# Patient Record
Sex: MALE | Race: WHITE | Employment: OTHER | URBAN - METROPOLITAN AREA
[De-identification: names, ages, dates, MRNs, and addresses within clinical notes are randomized per-mention and may not be internally consistent; named-entity substitution may affect disease eponyms.]

---

## 2017-09-09 PROBLEM — C34.11 MALIGNANT NEOPLASM OF UPPER LOBE OF RIGHT LUNG (HCC): Status: ACTIVE | Noted: 2017-09-09

## 2017-09-09 PROBLEM — Z98.890 STATUS POST THORACOTOMY: Status: ACTIVE | Noted: 2017-09-09

## 2017-09-09 PROBLEM — R07.81 PLEURITIC CHEST PAIN: Status: ACTIVE | Noted: 2017-09-09

## 2017-10-25 PROBLEM — C34.91 MALIGNANT NEOPLASM OF RIGHT LUNG, UNSPECIFIED PART OF LUNG (HCC): Status: ACTIVE | Noted: 2017-10-25

## 2017-10-25 PROBLEM — D64.9 ANEMIA, UNSPECIFIED TYPE: Status: ACTIVE | Noted: 2017-10-25

## 2018-01-20 PROBLEM — I10 ESSENTIAL HYPERTENSION: Status: ACTIVE | Noted: 2018-01-20

## 2018-01-20 PROBLEM — M54.50 CHRONIC BILATERAL LOW BACK PAIN WITHOUT SCIATICA: Status: ACTIVE | Noted: 2018-01-20

## 2018-01-20 PROBLEM — G89.29 CHRONIC BILATERAL LOW BACK PAIN WITHOUT SCIATICA: Status: ACTIVE | Noted: 2018-01-20

## 2018-06-11 PROBLEM — R07.81 PLEURITIC CHEST PAIN: Status: RESOLVED | Noted: 2017-09-09 | Resolved: 2018-06-11

## 2018-06-11 PROBLEM — D64.9 ANEMIA, UNSPECIFIED TYPE: Status: RESOLVED | Noted: 2017-10-25 | Resolved: 2018-06-11

## 2019-03-17 PROBLEM — G47.00 INSOMNIA, UNSPECIFIED TYPE: Status: ACTIVE | Noted: 2019-03-17

## 2019-03-17 PROBLEM — J44.9 CHRONIC OBSTRUCTIVE PULMONARY DISEASE, UNSPECIFIED COPD TYPE (HCC): Status: ACTIVE | Noted: 2019-03-17

## 2019-08-27 ENCOUNTER — APPOINTMENT (OUTPATIENT)
Dept: ULTRASOUND IMAGING | Facility: HOSPITAL | Age: 74
DRG: 246 | End: 2019-08-27
Attending: THORACIC SURGERY (CARDIOTHORACIC VASCULAR SURGERY)
Payer: MEDICARE

## 2019-08-27 ENCOUNTER — HOSPITAL ENCOUNTER (INPATIENT)
Facility: HOSPITAL | Age: 74
LOS: 9 days | Discharge: HOME HEALTH | DRG: 246 | End: 2019-09-05
Attending: THORACIC SURGERY (CARDIOTHORACIC VASCULAR SURGERY) | Admitting: THORACIC SURGERY (CARDIOTHORACIC VASCULAR SURGERY)
Payer: MEDICARE

## 2019-08-27 ENCOUNTER — APPOINTMENT (OUTPATIENT)
Dept: GENERAL RADIOLOGY | Facility: HOSPITAL | Age: 74
DRG: 246 | End: 2019-08-27
Attending: THORACIC SURGERY (CARDIOTHORACIC VASCULAR SURGERY)
Payer: MEDICARE

## 2019-08-27 DIAGNOSIS — Z98.890 STATUS POST THORACOTOMY: ICD-10-CM

## 2019-08-27 DIAGNOSIS — I10 ESSENTIAL HYPERTENSION: Primary | ICD-10-CM

## 2019-08-27 LAB
ALLENS TEST: POSITIVE
ANTIBODY SCREEN: NEGATIVE
APTT PPP: 26.3 SECONDS (ref 25.4–36.1)
ARTERIAL BLD GAS O2 SATURATION: 93 % (ref 92–100)
ARTERIAL BLOOD GAS BASE EXCESS: -1.5 MMOL/L (ref ?–2)
ARTERIAL BLOOD GAS HCO3: 23.3 MEQ/L (ref 22–26)
ARTERIAL BLOOD GAS PCO2: 40 MM HG (ref 35–45)
ARTERIAL BLOOD GAS PH: 7.38 (ref 7.35–7.45)
ARTERIAL BLOOD GAS PO2: 69 MM HG (ref 80–105)
CALCULATED O2 SATURATION: 93 % (ref 92–100)
CARBOXYHEMOGLOBIN: 1.4 % SAT (ref 0–3)
DEPRECATED RDW RBC AUTO: 53.2 FL (ref 35.1–46.3)
ERYTHROCYTE [DISTWIDTH] IN BLOOD BY AUTOMATED COUNT: 17.7 % (ref 11–15)
EST. AVERAGE GLUCOSE BLD GHB EST-MCNC: 134 MG/DL (ref 68–126)
HBA1C MFR BLD HPLC: 6.3 % (ref ?–5.7)
HCT VFR BLD AUTO: 40.5 % (ref 39–53)
HGB BLD-MCNC: 12.6 G/DL (ref 13–17.5)
INR BLD: 1.15 (ref 0.9–1.1)
MCH RBC QN AUTO: 26 PG (ref 26–34)
MCHC RBC AUTO-ENTMCNC: 31.1 G/DL (ref 31–37)
MCV RBC AUTO: 83.7 FL (ref 80–100)
METHEMOGLOBIN: 0.4 % SAT (ref 0.4–1.5)
P/F RATIO: 330.3 MMHG
PATIENT TEMPERATURE: 98.6 F
PLATELET # BLD AUTO: 241 10(3)UL (ref 150–450)
PSA SERPL DL<=0.01 NG/ML-MCNC: 15.2 SECONDS (ref 12.5–14.7)
RBC # BLD AUTO: 4.84 X10(6)UL (ref 3.8–5.8)
RH BLOOD TYPE: POSITIVE
TOTAL HEMOGLOBIN: 12.6 G/DL (ref 12.6–17.4)
WBC # BLD AUTO: 18.5 X10(3) UL (ref 4–11)

## 2019-08-27 PROCEDURE — 85730 THROMBOPLASTIN TIME PARTIAL: CPT | Performed by: PHYSICIAN ASSISTANT

## 2019-08-27 PROCEDURE — 30233N1 TRANSFUSION OF NONAUTOLOGOUS RED BLOOD CELLS INTO PERIPHERAL VEIN, PERCUTANEOUS APPROACH: ICD-10-PCS | Performed by: HOSPITALIST

## 2019-08-27 PROCEDURE — 83036 HEMOGLOBIN GLYCOSYLATED A1C: CPT | Performed by: PHYSICIAN ASSISTANT

## 2019-08-27 PROCEDURE — 82803 BLOOD GASES ANY COMBINATION: CPT | Performed by: THORACIC SURGERY (CARDIOTHORACIC VASCULAR SURGERY)

## 2019-08-27 PROCEDURE — 86901 BLOOD TYPING SEROLOGIC RH(D): CPT | Performed by: PHYSICIAN ASSISTANT

## 2019-08-27 PROCEDURE — 86920 COMPATIBILITY TEST SPIN: CPT

## 2019-08-27 PROCEDURE — 85027 COMPLETE CBC AUTOMATED: CPT | Performed by: PHYSICIAN ASSISTANT

## 2019-08-27 PROCEDURE — 85610 PROTHROMBIN TIME: CPT | Performed by: PHYSICIAN ASSISTANT

## 2019-08-27 PROCEDURE — 94640 AIRWAY INHALATION TREATMENT: CPT

## 2019-08-27 PROCEDURE — 36410 VNPNXR 3YR/> PHY/QHP DX/THER: CPT

## 2019-08-27 PROCEDURE — 86850 RBC ANTIBODY SCREEN: CPT | Performed by: PHYSICIAN ASSISTANT

## 2019-08-27 PROCEDURE — 87081 CULTURE SCREEN ONLY: CPT | Performed by: INTERNAL MEDICINE

## 2019-08-27 PROCEDURE — 82375 ASSAY CARBOXYHB QUANT: CPT | Performed by: THORACIC SURGERY (CARDIOTHORACIC VASCULAR SURGERY)

## 2019-08-27 PROCEDURE — 94010 BREATHING CAPACITY TEST: CPT

## 2019-08-27 PROCEDURE — 86900 BLOOD TYPING SEROLOGIC ABO: CPT | Performed by: PHYSICIAN ASSISTANT

## 2019-08-27 PROCEDURE — 76937 US GUIDE VASCULAR ACCESS: CPT

## 2019-08-27 PROCEDURE — 85018 HEMOGLOBIN: CPT | Performed by: THORACIC SURGERY (CARDIOTHORACIC VASCULAR SURGERY)

## 2019-08-27 PROCEDURE — 36600 WITHDRAWAL OF ARTERIAL BLOOD: CPT | Performed by: THORACIC SURGERY (CARDIOTHORACIC VASCULAR SURGERY)

## 2019-08-27 PROCEDURE — 71045 X-RAY EXAM CHEST 1 VIEW: CPT | Performed by: THORACIC SURGERY (CARDIOTHORACIC VASCULAR SURGERY)

## 2019-08-27 PROCEDURE — 83050 HGB METHEMOGLOBIN QUAN: CPT | Performed by: THORACIC SURGERY (CARDIOTHORACIC VASCULAR SURGERY)

## 2019-08-27 PROCEDURE — 93880 EXTRACRANIAL BILAT STUDY: CPT | Performed by: THORACIC SURGERY (CARDIOTHORACIC VASCULAR SURGERY)

## 2019-08-27 RX ORDER — METHYLPREDNISOLONE SODIUM SUCCINATE 125 MG/2ML
60 INJECTION, POWDER, LYOPHILIZED, FOR SOLUTION INTRAMUSCULAR; INTRAVENOUS EVERY 6 HOURS
Status: DISCONTINUED | OUTPATIENT
Start: 2019-08-28 | End: 2019-08-29

## 2019-08-27 RX ORDER — IPRATROPIUM BROMIDE AND ALBUTEROL SULFATE 2.5; .5 MG/3ML; MG/3ML
3 SOLUTION RESPIRATORY (INHALATION)
Status: DISCONTINUED | OUTPATIENT
Start: 2019-08-27 | End: 2019-08-27

## 2019-08-27 RX ORDER — METHYLPREDNISOLONE SODIUM SUCCINATE 125 MG/2ML
60 INJECTION, POWDER, LYOPHILIZED, FOR SOLUTION INTRAMUSCULAR; INTRAVENOUS EVERY 6 HOURS
Status: DISCONTINUED | OUTPATIENT
Start: 2019-08-27 | End: 2019-08-27

## 2019-08-27 RX ORDER — SODIUM CHLORIDE 0.9 % (FLUSH) 0.9 %
10 SYRINGE (ML) INJECTION EVERY 12 HOURS
Status: DISCONTINUED | OUTPATIENT
Start: 2019-08-27 | End: 2019-09-05

## 2019-08-27 RX ORDER — IPRATROPIUM BROMIDE AND ALBUTEROL SULFATE 2.5; .5 MG/3ML; MG/3ML
3 SOLUTION RESPIRATORY (INHALATION)
Status: DISCONTINUED | OUTPATIENT
Start: 2019-08-28 | End: 2019-08-31

## 2019-08-27 NOTE — RESPIRATORY THERAPY NOTE
RT called for ABG and bedside PFT orders, patient in ultrasound at this time. Will check back later.

## 2019-08-27 NOTE — CONSULTS
General Medicine Consult      Reason for consult:  medical management     Consulted by: Dr. Andreea Segundo    PCP: Madeline Lomeli MD      History of Present Illness:   Patient is a 66year old male with PMH sig for COPD, HTN, HLD, lung cancer s/p prior right lower lobe Medication:       ALL:  No Known Allergies     Soc Hx:  Social History    Tobacco Use      Smoking status: Former Smoker        Types: Cigarettes        Quit date: 1988        Years since quittin.9      Smokeless tobacco: Never Used    Alcohol use cancer s/p RLL lobectomy  - Pulmonary c/s  - IV steroids per pulmonary  - nebs    # Impaired fasting glucose  - Check sugar in AM  - No hx of DM    Dispo: admitted for evaluation for CABG    Outpatient records or previous hospital records reviewed.      Joanne

## 2019-08-27 NOTE — CONSULTS
[facility] Patient Status:  Inpatient    1945 MRN [id number]   [facility] 8NE-A Attending Sean Ontiveros MD   Hosp Day # 0 PCP Miriam Vásquez MD     Date of Admission: 2019  2:00 PM  Admission Diagnosis: CAD,mu cigarettes. He has never used smokeless tobacco. He reports that he does not drink alcohol or use drugs.       Family History:  Family History   Problem Relation Age of Onset   • Heart Attack Father          Home Medications:    Outpatient Medications Suzanna abuse  Endocrine: denies polydypsia, polyuria, cold/heat intolerance  Hematologic/lymphatic: denies easy bruising, new blood clots, or lymphedema  Allergic/immunologic: denies hay fever, hives, or new allergies       OBJECTIVE:  Patient Vitals for the past ASSESSMENT/PLAN:  Pre-op Pulmonary Risk Assessment:  Patient is at high risk for postoperative pulmonary complications including atelectasis, pneumonia, exacerbation of underlying COPD and potential need for prolonged mechanical ventilation.  - Patient h would likely be even lower currently  -will follow up on bedside PFT  -ABG without evidence of chronic hypercapnea  H/o RLL NSCLC: s/p RLL lobectomy 2017  -follows with [facility] oncology  -most recent CT without evidence of recurrent disease  Dispo:  -will fol

## 2019-08-27 NOTE — PROGRESS NOTES
Received report from RN at [facility]. Pt arrived to unit as a direct admit via EMS to room 8609. Pt denies any chest pain at this time, but does c/o SUNG. VSS. Alert and oriented X 4 . Pt arrived with a #20g in his left AC.  Consulted IV team to plac

## 2019-08-28 LAB
ALBUMIN SERPL-MCNC: 3.1 G/DL (ref 3.4–5)
ALBUMIN/GLOB SERPL: 0.9 {RATIO} (ref 1–2)
ALP LIVER SERPL-CCNC: 73 U/L (ref 45–117)
ALT SERPL-CCNC: 41 U/L (ref 16–61)
ANION GAP SERPL CALC-SCNC: 6 MMOL/L (ref 0–18)
BASOPHILS # BLD AUTO: 0.01 X10(3) UL (ref 0–0.2)
BASOPHILS NFR BLD AUTO: 0.1 %
BILIRUB SERPL-MCNC: 0.6 MG/DL (ref 0.1–2)
BILIRUB UR QL STRIP.AUTO: NEGATIVE
BUN BLD-MCNC: 42 MG/DL (ref 7–18)
BUN/CREAT SERPL: 44.2 (ref 10–20)
CALCIUM BLD-MCNC: 8.1 MG/DL (ref 8.5–10.1)
CHLORIDE SERPL-SCNC: 99 MMOL/L (ref 98–112)
CHOLEST SMN-MCNC: 165 MG/DL (ref ?–200)
CLARITY UR REFRACT.AUTO: CLEAR
CO2 SERPL-SCNC: 27 MMOL/L (ref 21–32)
COLOR UR AUTO: YELLOW
CREAT BLD-MCNC: 0.95 MG/DL (ref 0.7–1.3)
DEPRECATED RDW RBC AUTO: 51.3 FL (ref 35.1–46.3)
EOSINOPHIL # BLD AUTO: 0 X10(3) UL (ref 0–0.7)
EOSINOPHIL NFR BLD AUTO: 0 %
ERYTHROCYTE [DISTWIDTH] IN BLOOD BY AUTOMATED COUNT: 17.2 % (ref 11–15)
GLOBULIN PLAS-MCNC: 3.4 G/DL (ref 2.8–4.4)
GLUCOSE BLD-MCNC: 122 MG/DL (ref 70–99)
GLUCOSE UR STRIP.AUTO-MCNC: NEGATIVE MG/DL
HCT VFR BLD AUTO: 35.3 % (ref 39–53)
HDLC SERPL-MCNC: 36 MG/DL (ref 40–59)
HGB BLD-MCNC: 11.1 G/DL (ref 13–17.5)
IMM GRANULOCYTES # BLD AUTO: 0.06 X10(3) UL (ref 0–1)
IMM GRANULOCYTES NFR BLD: 0.5 %
KETONES UR STRIP.AUTO-MCNC: NEGATIVE MG/DL
LDLC SERPL CALC-MCNC: 94 MG/DL (ref ?–100)
LEUKOCYTE ESTERASE UR QL STRIP.AUTO: NEGATIVE
LYMPHOCYTES # BLD AUTO: 0.61 X10(3) UL (ref 1–4)
LYMPHOCYTES NFR BLD AUTO: 5.1 %
M PROTEIN MFR SERPL ELPH: 6.5 G/DL (ref 6.4–8.2)
MCH RBC QN AUTO: 25.9 PG (ref 26–34)
MCHC RBC AUTO-ENTMCNC: 31.4 G/DL (ref 31–37)
MCV RBC AUTO: 82.3 FL (ref 80–100)
MONOCYTES # BLD AUTO: 0.29 X10(3) UL (ref 0.1–1)
MONOCYTES NFR BLD AUTO: 2.4 %
NEUTROPHILS # BLD AUTO: 11.01 X10 (3) UL (ref 1.5–7.7)
NEUTROPHILS # BLD AUTO: 11.01 X10(3) UL (ref 1.5–7.7)
NEUTROPHILS NFR BLD AUTO: 91.9 %
NITRITE UR QL STRIP.AUTO: NEGATIVE
NONHDLC SERPL-MCNC: 129 MG/DL (ref ?–130)
OSMOLALITY SERPL CALC.SUM OF ELEC: 286 MOSM/KG (ref 275–295)
PH UR STRIP.AUTO: 6 [PH] (ref 4.5–8)
PLATELET # BLD AUTO: 148 10(3)UL (ref 150–450)
POTASSIUM SERPL-SCNC: 4.5 MMOL/L (ref 3.5–5.1)
PROT UR STRIP.AUTO-MCNC: NEGATIVE MG/DL
RBC # BLD AUTO: 4.29 X10(6)UL (ref 3.8–5.8)
RBC UR QL AUTO: NEGATIVE
SODIUM SERPL-SCNC: 132 MMOL/L (ref 136–145)
SP GR UR STRIP.AUTO: 1.01 (ref 1–1.03)
TRIGL SERPL-MCNC: 176 MG/DL (ref 30–149)
UROBILINOGEN UR STRIP.AUTO-MCNC: <2 MG/DL
VLDLC SERPL CALC-MCNC: 35 MG/DL (ref 0–30)
WBC # BLD AUTO: 12 X10(3) UL (ref 4–11)

## 2019-08-28 PROCEDURE — 80053 COMPREHEN METABOLIC PANEL: CPT | Performed by: PHYSICIAN ASSISTANT

## 2019-08-28 PROCEDURE — 94640 AIRWAY INHALATION TREATMENT: CPT

## 2019-08-28 PROCEDURE — 84132 ASSAY OF SERUM POTASSIUM: CPT | Performed by: INTERNAL MEDICINE

## 2019-08-28 PROCEDURE — 80061 LIPID PANEL: CPT | Performed by: PHYSICIAN ASSISTANT

## 2019-08-28 PROCEDURE — 85025 COMPLETE CBC W/AUTO DIFF WBC: CPT | Performed by: INTERNAL MEDICINE

## 2019-08-28 PROCEDURE — 81003 URINALYSIS AUTO W/O SCOPE: CPT | Performed by: PHYSICIAN ASSISTANT

## 2019-08-28 PROCEDURE — 36592 COLLECT BLOOD FROM PICC: CPT

## 2019-08-28 RX ORDER — ATORVASTATIN CALCIUM 20 MG/1
20 TABLET, FILM COATED ORAL NIGHTLY
Status: DISCONTINUED | OUTPATIENT
Start: 2019-08-28 | End: 2019-09-05

## 2019-08-28 RX ORDER — LISINOPRIL 20 MG/1
20 TABLET ORAL DAILY
Status: DISCONTINUED | OUTPATIENT
Start: 2019-08-28 | End: 2019-09-02

## 2019-08-28 RX ORDER — ASPIRIN 81 MG/1
81 TABLET ORAL DAILY
Status: DISCONTINUED | OUTPATIENT
Start: 2019-08-28 | End: 2019-08-30

## 2019-08-28 NOTE — PROCEDURES
This test includes spirometry and flow volume loop done at the bedside during an inpatient hospitalization. Spirometry and  flow volume loop show evidence of  severe obstruction. Spirometry  suggests  restriction.  Complete PFT with lung volume measu

## 2019-08-28 NOTE — PROGRESS NOTES
[facility] Hospitalist Progress Note     [facility]      SUBJECTIVE:  Feeling well  Breathing improving    OBJECTIVE:  Temp:  [97.6 °F (36.4 °C)-98.5 °F (36.9 °C)] 98.5 °F (36.9 °C)  Pulse:  [61-82] 76  Resp:  [16-18] 18  BP: (121-157)/(48-98) 150/89    In by Technologist)  Patient offered no additional history at this time. FINDINGS:   IMAGE FINDINGS:  Mild atheromatous plaque at the bifurcations. HEART RATE:      Regular.   VELOCITY MEASUREMENTS:                         Right CCA Peak Systolic Velocity: (LIPITOR) tab 20 mg 20 mg Oral Nightly   lisinopril (PRINIVIL,ZESTRIL) tab 20 mg 20 mg Oral Daily   mupirocin (BACTROBAN) 2% nasal ointment OINT 1 Application 1 Application Nasal Now then every 0500   Normal Saline Flush 0.9 % injection 10 mL 10 mL [facility]

## 2019-08-28 NOTE — H&P
I had the opportunity to see our patient, Mr. Jonathan Doshi, today on transfer from [facility].    As you know, Mr. Efren Tee (who is Otilia's uncle) is a 73-year-old gentleman with no prior cardiac history.   His prior history has been that of lung On review of systems, the patient has no complaints of fatigue, blurred vision, or hearing problems. The patient denies palpitations, wheezing, syncope, or dizziness. There are no symptoms of GI reflux, urgency or frequency of urination, or rashes.   The

## 2019-08-28 NOTE — PLAN OF CARE
Assumed pt care at 0730. Pt up ambulating in his room. Denies any pain this morning. VSS. Alert and oriented X 4. Pt scheduled for PFT's at [id number] today.  CV surgery on hold for a couple days to allow for improved pulm func, pt and famil heart rate control medications as ordered  - Initiate emergency measures for life threatening arrhythmias  - Monitor electrolytes and administer replacement therapy as ordered  Outcome: Progressing     Problem: RESPIRATORY - ADULT  Goal: Achieves optimal v maintained  Description  INTERVENTIONS:  - Monitor labs and assess for signs and symptoms of volume excess or deficit  - Monitor intake, output and patient weight  - Monitor urine specific gravity, serum osmolarity and serum sodium as indicated or ordered

## 2019-08-28 NOTE — CONSULTS
[facility] Cardiology Consultation    Andrea Honeycutt Patient Status:  Inpatient    1945 MRN [id number]   [facility] 8NE-A Attending Frank Huddleston MD   Hosp Day # 1 PCP Raymond Thomas MD     Reason for Consultation:  CAD      History of Present that he quit smoking about 30 years ago. His smoking use included cigarettes. He has never used smokeless tobacco. He reports that he does not drink alcohol or use drugs.     Review of Systems:  All systems were reviewed and are negative except as described revascularization. I do not feel this is an emergent situation and we should sort out his lung disease first - regardless of whether we pursue CABG or complex left main PCI. Begin ASA, Lipitor.   Lisinopril 20 mg daily    Low Kirk  8/28/2019

## 2019-08-28 NOTE — PROGRESS NOTES
[address] [facility] Patient Status:  Inpatient    1945 MRN [id number]   [facility] 8NE-A Attending Cadence Montero MD   Hosp Day # 1 PCP Daryle Roller, MD     Pulm / Critical Care Progress Note     S: overall pt is feeling be --    CO2 27.0  --    BUN 42*  --      Creatinine (mg/dL)   Date Value   08/28/2019 0.95   ]    Recent Labs   Lab 08/28/19  0504   ALT 41          pfts at bedside:    FEV1: 0.90 L (30% predicted)   FVC: 2.11 L (52% predicted)   Ratio: 42%   DLCO: 32% predi control to minimize splinting and promote deep breathing, use of incentive spirometry, early mobilization as appropriate from surgical standpoint and DVT prophylaxis.   2. COPD: now with acute exacerbation  -continue IV steroids; to po tomorrow  -BD leeroyo

## 2019-08-28 NOTE — PLAN OF CARE
Alert. Oriented. Jose Parkinson. Sr per tele. Denies pain, sob. No wheezing noted. Teds in place. Declined scds tonight. Vss. Poc discussed with patient. Cont. to monitor pt.

## 2019-08-29 LAB
ANION GAP SERPL CALC-SCNC: 6 MMOL/L (ref 0–18)
BASOPHILS # BLD AUTO: 0 X10(3) UL (ref 0–0.2)
BASOPHILS NFR BLD AUTO: 0 %
BUN BLD-MCNC: 29 MG/DL (ref 7–18)
BUN/CREAT SERPL: 31.9 (ref 10–20)
CALCIUM BLD-MCNC: 8.4 MG/DL (ref 8.5–10.1)
CHLORIDE SERPL-SCNC: 103 MMOL/L (ref 98–112)
CO2 SERPL-SCNC: 29 MMOL/L (ref 21–32)
CREAT BLD-MCNC: 0.91 MG/DL (ref 0.7–1.3)
DEPRECATED RDW RBC AUTO: 51 FL (ref 35.1–46.3)
EOSINOPHIL # BLD AUTO: 0 X10(3) UL (ref 0–0.7)
EOSINOPHIL NFR BLD AUTO: 0 %
ERYTHROCYTE [DISTWIDTH] IN BLOOD BY AUTOMATED COUNT: 17.4 % (ref 11–15)
GLUCOSE BLD-MCNC: 143 MG/DL (ref 70–99)
HCT VFR BLD AUTO: 38.3 % (ref 39–53)
HGB BLD-MCNC: 12.3 G/DL (ref 13–17.5)
IMM GRANULOCYTES # BLD AUTO: 0.13 X10(3) UL (ref 0–1)
IMM GRANULOCYTES NFR BLD: 1.2 %
LYMPHOCYTES # BLD AUTO: 0.5 X10(3) UL (ref 1–4)
LYMPHOCYTES NFR BLD AUTO: 4.6 %
MCH RBC QN AUTO: 26.1 PG (ref 26–34)
MCHC RBC AUTO-ENTMCNC: 32.1 G/DL (ref 31–37)
MCV RBC AUTO: 81.3 FL (ref 80–100)
MONOCYTES # BLD AUTO: 0.33 X10(3) UL (ref 0.1–1)
MONOCYTES NFR BLD AUTO: 3 %
NEUTROPHILS # BLD AUTO: 9.97 X10 (3) UL (ref 1.5–7.7)
NEUTROPHILS # BLD AUTO: 9.97 X10(3) UL (ref 1.5–7.7)
NEUTROPHILS NFR BLD AUTO: 91.2 %
OSMOLALITY SERPL CALC.SUM OF ELEC: 294 MOSM/KG (ref 275–295)
PLATELET # BLD AUTO: 188 10(3)UL (ref 150–450)
POTASSIUM SERPL-SCNC: 4.8 MMOL/L (ref 3.5–5.1)
RBC # BLD AUTO: 4.71 X10(6)UL (ref 3.8–5.8)
SODIUM SERPL-SCNC: 138 MMOL/L (ref 136–145)
WBC # BLD AUTO: 10.9 X10(3) UL (ref 4–11)

## 2019-08-29 PROCEDURE — 93005 ELECTROCARDIOGRAM TRACING: CPT

## 2019-08-29 PROCEDURE — 94640 AIRWAY INHALATION TREATMENT: CPT

## 2019-08-29 PROCEDURE — 93010 ELECTROCARDIOGRAM REPORT: CPT | Performed by: INTERNAL MEDICINE

## 2019-08-29 PROCEDURE — 85025 COMPLETE CBC W/AUTO DIFF WBC: CPT | Performed by: INTERNAL MEDICINE

## 2019-08-29 PROCEDURE — 94664 DEMO&/EVAL PT USE INHALER: CPT

## 2019-08-29 PROCEDURE — 80048 BASIC METABOLIC PNL TOTAL CA: CPT | Performed by: INTERNAL MEDICINE

## 2019-08-29 RX ORDER — CALCIUM CARBONATE 200(500)MG
1000 TABLET,CHEWABLE ORAL 3 TIMES DAILY PRN
Status: DISCONTINUED | OUTPATIENT
Start: 2019-08-29 | End: 2019-09-05

## 2019-08-29 RX ORDER — SODIUM CHLORIDE 9 MG/ML
INJECTION, SOLUTION INTRAVENOUS
Status: COMPLETED | OUTPATIENT
Start: 2019-08-30 | End: 2019-08-30

## 2019-08-29 RX ORDER — POLYETHYLENE GLYCOL 3350 17 G/17G
17 POWDER, FOR SOLUTION ORAL DAILY
Status: DISCONTINUED | OUTPATIENT
Start: 2019-08-29 | End: 2019-09-05

## 2019-08-29 RX ORDER — PREDNISONE 20 MG/1
40 TABLET ORAL
Status: DISCONTINUED | OUTPATIENT
Start: 2019-08-29 | End: 2019-08-29

## 2019-08-29 RX ORDER — METHYLPREDNISOLONE SODIUM SUCCINATE 40 MG/ML
40 INJECTION, POWDER, LYOPHILIZED, FOR SOLUTION INTRAMUSCULAR; INTRAVENOUS EVERY 8 HOURS
Status: DISCONTINUED | OUTPATIENT
Start: 2019-08-29 | End: 2019-09-02

## 2019-08-29 NOTE — PROCEDURES
Findings: Only a diffusion capacity assessment was requested for this study. The diffusion capacity is 36% predicted and 47% predicted when corrected for alveolar volume. Impression:  Diffusion capacity is severely reduced with DLCO/VA of 47%.  This

## 2019-08-29 NOTE — PROGRESS NOTES
[facility] Cardiology Progress Note        Usha Pinto Patient Status:  Inpatient    1945 MRN [id number]   [facility] 8NE-A Attending Kerri Finn MD   Hosp Day # 2 PCP Tristan العلي MD     Subjective:  He feel BUN 42*  --  29*   CREATSERUM 0.95  --  0.91   CA 8.1*  --  8.4*   *  --  143*       Recent Labs   Lab 08/28/19  0504   ALT 41   ALB 3.1*       No results for input(s): TROP, CK in the last 168 hours. Recent Labs   Lab 08/27/19  1524   PTP 15. 2

## 2019-08-29 NOTE — PROGRESS NOTES
[facility] Hospitalist Progress Note     [facility]      SUBJECTIVE:  Feeling well  Breathing improving    OBJECTIVE:  Temp:  [97.5 °F (36.4 °C)-98.8 °F (37.1 °C)] 98 °F (36.7 °C)  Pulse:  [] 117  Resp:  [16-20] 18  BP: (138-158)/(56-96) 158/96    In using Consensus Panel categories and NASCET criteria  PATIENT STATED HISTORY: (As transcribed by Technologist)  Patient offered no additional history at this time. FINDINGS:   IMAGE FINDINGS:  Mild atheromatous plaque at the bifurcations.   HEART RATE: file.      Current Facility-Administered Medications:  methylPREDNISolone Sodium Succ (Solu-MEDROL) injection 40 mg 40 mg Intravenous Q8H   aspirin EC tab 81 mg 81 mg Oral Daily   atorvastatin (LIPITOR) tab 20 mg 20 mg Oral Nightly   lisinopril (PRINIVIL,Z

## 2019-08-29 NOTE — PLAN OF CARE
Assumed care of pt at 0730. Feels breathing is better. Denies any chest pain or SOB. Sinus rhythm to sinus tachy, HR 90-110s. Sats > 92% on RA. Plan for angiogram tomorrow. Will continue to monitor.     Problem: CARDIOVASCULAR - ADULT  Goal: Maintains op retention  Outcome: Progressing

## 2019-08-29 NOTE — PROGRESS NOTES
1300 [facility] Patient Status:  Inpatient    1945 MRN [id number]   [facility] 8NE-A Attending Sloan Loyola MD   Hosp Day # 2 PCP Diandra Ojeda MD     Pulm / Critical Care Progress Note     S: overall feeling better. Recent Labs   Lab 08/28/19  0504 08/28/19  0557 08/29/19  0545   *  --  138   K  --  4.5 4.8   CL 99  --  103   CO2 27.0  --  29.0   BUN 42*  --  29*     Creatinine (mg/dL)   Date Value   08/29/2019 0.91   08/28/2019 0.95   ]    Recent Labs control to minimize splinting and promote deep breathing, use of incentive spirometry, early mobilization as appropriate from surgical standpoint and DVT prophylaxis.   2. COPD: now with acute exacerbation  -continue IV steroids to minimize chance of re-fla

## 2019-08-29 NOTE — PLAN OF CARE
Alert. Oriented. Sr per tele. On room air. Lung sound diminished. No wheezing noted. Denies any souza/ sob. Up ad bull. Prep for possible pci? Incentive spirometry up to 1000. Declined scds. Teds on. Poc updated w/ pt and family. Cont. to monitor pt.

## 2019-08-30 ENCOUNTER — APPOINTMENT (OUTPATIENT)
Dept: INTERVENTIONAL RADIOLOGY/VASCULAR | Facility: HOSPITAL | Age: 74
DRG: 246 | End: 2019-08-30
Attending: INTERNAL MEDICINE
Payer: MEDICARE

## 2019-08-30 LAB
ANION GAP SERPL CALC-SCNC: 7 MMOL/L (ref 0–18)
ATRIAL RATE: 106 BPM
ATRIAL RATE: 107 BPM
BLOOD TYPE BARCODE: 6200
BUN BLD-MCNC: 28 MG/DL (ref 7–18)
BUN/CREAT SERPL: 29.8 (ref 10–20)
CALCIUM BLD-MCNC: 8.4 MG/DL (ref 8.5–10.1)
CHLORIDE SERPL-SCNC: 102 MMOL/L (ref 98–112)
CO2 SERPL-SCNC: 30 MMOL/L (ref 21–32)
CREAT BLD-MCNC: 0.94 MG/DL (ref 0.7–1.3)
GLUCOSE BLD-MCNC: 125 MG/DL (ref 70–99)
ISTAT ACTIVATED CLOTTING TIME: 224 SECONDS (ref 74–137)
ISTAT ACTIVATED CLOTTING TIME: 230 SECONDS (ref 74–137)
OSMOLALITY SERPL CALC.SUM OF ELEC: 295 MOSM/KG (ref 275–295)
P AXIS: 17 DEGREES
P AXIS: 69 DEGREES
P-R INTERVAL: 174 MS
P-R INTERVAL: 182 MS
POTASSIUM SERPL-SCNC: 4.5 MMOL/L (ref 3.5–5.1)
Q-T INTERVAL: 318 MS
Q-T INTERVAL: 342 MS
QRS DURATION: 78 MS
QRS DURATION: 84 MS
QTC CALCULATION (BEZET): 422 MS
QTC CALCULATION (BEZET): 454 MS
R AXIS: 54 DEGREES
R AXIS: 56 DEGREES
SODIUM SERPL-SCNC: 139 MMOL/L (ref 136–145)
T AXIS: 33 DEGREES
T AXIS: 48 DEGREES
VENTRICULAR RATE: 106 BPM
VENTRICULAR RATE: 106 BPM

## 2019-08-30 PROCEDURE — 93005 ELECTROCARDIOGRAM TRACING: CPT

## 2019-08-30 PROCEDURE — 94640 AIRWAY INHALATION TREATMENT: CPT

## 2019-08-30 PROCEDURE — 93010 ELECTROCARDIOGRAM REPORT: CPT | Performed by: INTERNAL MEDICINE

## 2019-08-30 PROCEDURE — 99152 MOD SED SAME PHYS/QHP 5/>YRS: CPT

## 2019-08-30 PROCEDURE — 85347 COAGULATION TIME ACTIVATED: CPT

## 2019-08-30 PROCEDURE — 92978 ENDOLUMINL IVUS OCT C 1ST: CPT

## 2019-08-30 PROCEDURE — 92920 PRQ TRLUML C ANGIOP 1ART&/BR: CPT

## 2019-08-30 PROCEDURE — B41G1ZZ FLUOROSCOPY OF LEFT LOWER EXTREMITY ARTERIES USING LOW OSMOLAR CONTRAST: ICD-10-PCS | Performed by: INTERNAL MEDICINE

## 2019-08-30 PROCEDURE — 92979 ENDOLUMINL IVUS OCT C EA: CPT

## 2019-08-30 PROCEDURE — 80048 BASIC METABOLIC PNL TOTAL CA: CPT | Performed by: INTERNAL MEDICINE

## 2019-08-30 PROCEDURE — 027034Z DILATION OF CORONARY ARTERY, ONE ARTERY WITH DRUG-ELUTING INTRALUMINAL DEVICE, PERCUTANEOUS APPROACH: ICD-10-PCS | Performed by: INTERNAL MEDICINE

## 2019-08-30 PROCEDURE — 02C03ZZ EXTIRPATION OF MATTER FROM CORONARY ARTERY, ONE ARTERY, PERCUTANEOUS APPROACH: ICD-10-PCS | Performed by: INTERNAL MEDICINE

## 2019-08-30 PROCEDURE — 99153 MOD SED SAME PHYS/QHP EA: CPT

## 2019-08-30 PROCEDURE — B241ZZ3 ULTRASONOGRAPHY OF MULTIPLE CORONARY ARTERIES, INTRAVASCULAR: ICD-10-PCS | Performed by: INTERNAL MEDICINE

## 2019-08-30 PROCEDURE — 94664 DEMO&/EVAL PT USE INHALER: CPT

## 2019-08-30 PROCEDURE — 02703ZZ DILATION OF CORONARY ARTERY, ONE ARTERY, PERCUTANEOUS APPROACH: ICD-10-PCS | Performed by: INTERNAL MEDICINE

## 2019-08-30 RX ORDER — SODIUM CHLORIDE 9 MG/ML
INJECTION, SOLUTION INTRAVENOUS CONTINUOUS
Status: ACTIVE | OUTPATIENT
Start: 2019-08-30 | End: 2019-08-30

## 2019-08-30 RX ORDER — CLOPIDOGREL BISULFATE 75 MG/1
75 TABLET ORAL DAILY
Status: DISCONTINUED | OUTPATIENT
Start: 2019-08-31 | End: 2019-09-05

## 2019-08-30 RX ORDER — HEPARIN SODIUM 5000 [USP'U]/ML
INJECTION, SOLUTION INTRAVENOUS; SUBCUTANEOUS
Status: COMPLETED
Start: 2019-08-30 | End: 2019-08-30

## 2019-08-30 RX ORDER — SENNA AND DOCUSATE SODIUM 50; 8.6 MG/1; MG/1
2 TABLET, FILM COATED ORAL DAILY
Status: DISCONTINUED | OUTPATIENT
Start: 2019-08-30 | End: 2019-08-30

## 2019-08-30 RX ORDER — LIDOCAINE HYDROCHLORIDE 10 MG/ML
INJECTION, SOLUTION EPIDURAL; INFILTRATION; INTRACAUDAL; PERINEURAL
Status: COMPLETED
Start: 2019-08-30 | End: 2019-08-30

## 2019-08-30 RX ORDER — MIDAZOLAM HYDROCHLORIDE 1 MG/ML
INJECTION INTRAMUSCULAR; INTRAVENOUS
Status: COMPLETED
Start: 2019-08-30 | End: 2019-08-30

## 2019-08-30 RX ORDER — HEPARIN SODIUM 1000 [USP'U]/ML
INJECTION, SOLUTION INTRAVENOUS; SUBCUTANEOUS
Status: COMPLETED
Start: 2019-08-30 | End: 2019-08-30

## 2019-08-30 RX ORDER — HEPARIN SODIUM 5000 [USP'U]/ML
5000 INJECTION, SOLUTION INTRAVENOUS; SUBCUTANEOUS EVERY 8 HOURS SCHEDULED
Status: DISCONTINUED | OUTPATIENT
Start: 2019-08-30 | End: 2019-08-31

## 2019-08-30 RX ORDER — NICARDIPINE HYDROCHLORIDE 2.5 MG/ML
INJECTION INTRAVENOUS
Status: COMPLETED
Start: 2019-08-30 | End: 2019-08-30

## 2019-08-30 RX ORDER — ASPIRIN 81 MG/1
81 TABLET ORAL DAILY
Status: DISCONTINUED | OUTPATIENT
Start: 2019-08-31 | End: 2019-09-05

## 2019-08-30 RX ORDER — ALPRAZOLAM 0.5 MG/1
0.5 TABLET ORAL ONCE
Status: COMPLETED | OUTPATIENT
Start: 2019-08-30 | End: 2019-08-30

## 2019-08-30 RX ORDER — SENNA AND DOCUSATE SODIUM 50; 8.6 MG/1; MG/1
2 TABLET, FILM COATED ORAL
Status: DISCONTINUED | OUTPATIENT
Start: 2019-08-30 | End: 2019-09-05

## 2019-08-30 RX ORDER — CLOPIDOGREL BISULFATE 75 MG/1
TABLET ORAL
Status: COMPLETED
Start: 2019-08-30 | End: 2019-08-30

## 2019-08-30 NOTE — PROGRESS NOTES
[facility] Hospitalist Progress Note     [facility]      SUBJECTIVE:  Feels well, though reports new RLE edema/swelling this AM    OBJECTIVE:  Temp:  [97.5 °F (36.4 °C)-98.6 °F (37 °C)] 98.6 °F (37 °C)  Pulse:  [] 95  Resp:  [18-20] 18  BP: (140-161) flow Doppler imaging were  performed.   Stenosis measurements obtained in this exam were performed using Consensus Panel categories and NASCET criteria  PATIENT STATED HISTORY: (As transcribed by Technologist)  Patient offered no additional history at this by: Una Contreras MD               Meds:     No current outpatient medications on file.       Current Facility-Administered Medications:  iohexol (OMNIPAQUE) 350 MG/ML injection 100 mL 100 mL Injection ONCE PRN   methylPREDNISolone Sodium Succ (Solu-MEDR

## 2019-08-30 NOTE — DIETARY NOTE
Clinical Nutrition Note    Dietitian consult received per cardiac rehab/CHF protocol. Pt to be educated by cardiac rehab staff and encouraged to attend outpatient classes taught by NAHUN. RD available PRN.     Paulina Tyson RD, [address] [facility]  Pager #[phone]

## 2019-08-30 NOTE — PROGRESS NOTES
Cath:    PCI to ULM.    7F, LCFA. BMW in LAD, rota floppy, 1.75mm gregorio. Ballooned with 2.5 exchange balloon. Then 3.0mm NC. Full expansion. Wired CX. Ballooned CX with 2.0. Stented with 3.5x18mm [place]. Posted 3.5mm NC. Rewired CX.   Unjailed with

## 2019-08-30 NOTE — PROGRESS NOTES
[address] [facility] Patient Status:  Inpatient    1945 MRN [id number]   [facility] 8NE-A Attending Marcelino Sue MD   Hosp Day # 3 PCP Prosper Laguna MD     SUBJECTIVE: Pt states breathing is currently back to his baseline. BS.                          Extremity: No clubbing or cyanosis. RLE> LLE edema                          Skin: No rashes or lesions.        Lab Results   Component Value Date     08/30/2019    K 4.5 08/30/2019     08/30/2019    CO2 30.0 08/30/20

## 2019-08-30 NOTE — PROGRESS NOTES
NSR/ST on telemetry with rates to 120. VSS with blood pressure elevated this A.M. Lisinopril given as ordered On room air with sats > 92%. Inhalers given as ordered. Lung sounds diminished. Plan for angiogram today. Pedal pulses doppler, marked.  BLE swell respiratory difficulty  - Respiratory Therapy support as indicated  - Manage/alleviate anxiety  - Monitor for signs/symptoms of CO2 retention  Outcome: Progressing

## 2019-08-30 NOTE — PROGRESS NOTES
[facility] Cardiology Progress Note        Trey Foy Patient Status:  Inpatient    1945 MRN [id number]   [facility] 8NE-A Attending Rosanne Bond MD   Hosp Day # 3 PCP Glena Hashimoto, MD     Subjective:  He feel 08/28/19  0557 08/29/19  0545 08/30/19  0525   *  --  138 139   K  --  4.5 4.8 4.5   CL 99  --  103 102   CO2 27.0  --  29.0 30.0   BUN 42*  --  29* 28*   CREATSERUM 0.95  --  0.91 0.94   CA 8.1*  --  8.4* 8.4*   *  --  143* 125*       Recent

## 2019-08-31 ENCOUNTER — APPOINTMENT (OUTPATIENT)
Dept: ULTRASOUND IMAGING | Facility: HOSPITAL | Age: 74
DRG: 246 | End: 2019-08-31
Attending: INTERNAL MEDICINE
Payer: MEDICARE

## 2019-08-31 LAB
ANION GAP SERPL CALC-SCNC: 6 MMOL/L (ref 0–18)
ANION GAP SERPL CALC-SCNC: 8 MMOL/L (ref 0–18)
APTT PPP: 212.9 SECONDS (ref 25.4–36.1)
APTT PPP: 23.4 SECONDS (ref 25.4–36.1)
BUN BLD-MCNC: 34 MG/DL (ref 7–18)
BUN BLD-MCNC: 42 MG/DL (ref 7–18)
BUN/CREAT SERPL: 38.2 (ref 10–20)
BUN/CREAT SERPL: 42.4 (ref 10–20)
CALCIUM BLD-MCNC: 7.9 MG/DL (ref 8.5–10.1)
CALCIUM BLD-MCNC: 8.1 MG/DL (ref 8.5–10.1)
CHLORIDE SERPL-SCNC: 104 MMOL/L (ref 98–112)
CHLORIDE SERPL-SCNC: 99 MMOL/L (ref 98–112)
CO2 SERPL-SCNC: 27 MMOL/L (ref 21–32)
CO2 SERPL-SCNC: 28 MMOL/L (ref 21–32)
CREAT BLD-MCNC: 0.89 MG/DL (ref 0.7–1.3)
CREAT BLD-MCNC: 0.99 MG/DL (ref 0.7–1.3)
DEPRECATED RDW RBC AUTO: 52.7 FL (ref 35.1–46.3)
DEPRECATED RDW RBC AUTO: 53.4 FL (ref 35.1–46.3)
ERYTHROCYTE [DISTWIDTH] IN BLOOD BY AUTOMATED COUNT: 17.8 % (ref 11–15)
ERYTHROCYTE [DISTWIDTH] IN BLOOD BY AUTOMATED COUNT: 18 % (ref 11–15)
GLUCOSE BLD-MCNC: 112 MG/DL (ref 70–99)
GLUCOSE BLD-MCNC: 113 MG/DL (ref 70–99)
HCT VFR BLD AUTO: 36.6 % (ref 39–53)
HCT VFR BLD AUTO: 37.8 % (ref 39–53)
HGB BLD-MCNC: 11.6 G/DL (ref 13–17.5)
HGB BLD-MCNC: 11.8 G/DL (ref 13–17.5)
MCH RBC QN AUTO: 26 PG (ref 26–34)
MCH RBC QN AUTO: 26 PG (ref 26–34)
MCHC RBC AUTO-ENTMCNC: 31.2 G/DL (ref 31–37)
MCHC RBC AUTO-ENTMCNC: 31.7 G/DL (ref 31–37)
MCV RBC AUTO: 82.1 FL (ref 80–100)
MCV RBC AUTO: 83.3 FL (ref 80–100)
OSMOLALITY SERPL CALC.SUM OF ELEC: 291 MOSM/KG (ref 275–295)
OSMOLALITY SERPL CALC.SUM OF ELEC: 292 MOSM/KG (ref 275–295)
PLATELET # BLD AUTO: 180 10(3)UL (ref 150–450)
PLATELET # BLD AUTO: 188 10(3)UL (ref 150–450)
POTASSIUM SERPL-SCNC: 4.4 MMOL/L (ref 3.5–5.1)
POTASSIUM SERPL-SCNC: 4.4 MMOL/L (ref 3.5–5.1)
RBC # BLD AUTO: 4.46 X10(6)UL (ref 3.8–5.8)
RBC # BLD AUTO: 4.54 X10(6)UL (ref 3.8–5.8)
SODIUM SERPL-SCNC: 135 MMOL/L (ref 136–145)
SODIUM SERPL-SCNC: 137 MMOL/L (ref 136–145)
WBC # BLD AUTO: 11.3 X10(3) UL (ref 4–11)
WBC # BLD AUTO: 15.9 X10(3) UL (ref 4–11)

## 2019-08-31 PROCEDURE — 85027 COMPLETE CBC AUTOMATED: CPT | Performed by: INTERNAL MEDICINE

## 2019-08-31 PROCEDURE — 80048 BASIC METABOLIC PNL TOTAL CA: CPT | Performed by: INTERNAL MEDICINE

## 2019-08-31 PROCEDURE — 85730 THROMBOPLASTIN TIME PARTIAL: CPT | Performed by: INTERNAL MEDICINE

## 2019-08-31 PROCEDURE — 94640 AIRWAY INHALATION TREATMENT: CPT

## 2019-08-31 PROCEDURE — 94664 DEMO&/EVAL PT USE INHALER: CPT

## 2019-08-31 PROCEDURE — 93971 EXTREMITY STUDY: CPT | Performed by: INTERNAL MEDICINE

## 2019-08-31 RX ORDER — HEPARIN SODIUM AND DEXTROSE 10000; 5 [USP'U]/100ML; G/100ML
INJECTION INTRAVENOUS CONTINUOUS
Status: DISCONTINUED | OUTPATIENT
Start: 2019-08-31 | End: 2019-09-01

## 2019-08-31 RX ORDER — HEPARIN SODIUM AND DEXTROSE 10000; 5 [USP'U]/100ML; G/100ML
18 INJECTION INTRAVENOUS ONCE
Status: COMPLETED | OUTPATIENT
Start: 2019-08-31 | End: 2019-08-31

## 2019-08-31 RX ORDER — IPRATROPIUM BROMIDE AND ALBUTEROL SULFATE 2.5; .5 MG/3ML; MG/3ML
3 SOLUTION RESPIRATORY (INHALATION)
Status: DISCONTINUED | OUTPATIENT
Start: 2019-08-31 | End: 2019-09-02

## 2019-08-31 RX ORDER — HEPARIN SODIUM 5000 [USP'U]/ML
80 INJECTION INTRAVENOUS; SUBCUTANEOUS ONCE
Status: COMPLETED | OUTPATIENT
Start: 2019-08-31 | End: 2019-08-31

## 2019-08-31 NOTE — CARDIAC REHAB
All CAD education completed with pt. He is considering cardiac rehab at [facility], closer to where he lives. Stent card not received yet. Encouraged pt to inquire about his stent card at his cardiology f/u appt.

## 2019-08-31 NOTE — PROGRESS NOTES
[facility] Hospitalist Progress Note     [facility]      SUBJECTIVE:  Feels well, continues to have RLE swelling, doppler pending this mroning when seen    OBJECTIVE:  Temp:  [97.5 °F (36.4 °C)-99 °F (37.2 °C)] 97.5 °F (36.4 °C)  Pulse:  [] 58  Resp: Real-time gray-scale and duplex ultrasound was used to evaluate the bilateral extracranial carotid and vertebral arteries. B-mode 2-dimensional images of the vascular structures, Doppler spectral analysis, and color flow Doppler imaging were  performed. Hyperexpansion of the lungs. No pneumothorax. CONCLUSION:  Small right-sided pleural effusion with right basilar atelectasis/infiltrates.     Dictated by: Marjorie Hartman MD on 8/27/2019 at 17:34     Approved by: Marjorie Hartman MD               Me s/p RLL lobectomy  - Pulmonary c/s -- high risk for surgery at this time  - Steroids per pulmonary  - nebs  - breo inhaler initiated  - spirometry done per pulmonary as patient had not had since prior to lobectomy -- interpretation limited in setting of ac

## 2019-08-31 NOTE — PLAN OF CARE
Patient is alert and oriented x4. Patient asking about plan of care. Patient updated on plan of care. Denies questions or concerns at this time. Patient verbalized understanding of education. Patient waiting for ultrasound of right lower extremity today.  [place]arson No effectiveness of antiarrhythmic and heart rate control medications as ordered  - Initiate emergency measures for life threatening arrhythmias  - Monitor electrolytes and administer replacement therapy as ordered  Outcome: Progressing     Problem: RESPIRATO renal function maintained  Description  INTERVENTIONS:  - Monitor labs and assess for signs and symptoms of volume excess or deficit  - Monitor intake, output and patient weight  - Monitor urine specific gravity, serum osmolarity and serum sodium as indica

## 2019-08-31 NOTE — PROGRESS NOTES
[facility] Cardiology Progress Note        Lavonne Gonzalez Patient Status:  Inpatient    1945 MRN [id number]   [facility] 8NE-A Attending Rubi Nix MD   Hosp Day # 4 PCP Marshall Abbott MD     Subjective:  He feel Telemetry: SR / ST    EKG:      Echo:      Cardiac Cath:  PCI to ULM.    7F, LCFA.     BMW in LAD, rota floppy, 1.75mm gregorio. Ballooned with 2.5 exchange balloon. Then 3.0mm NC. Full expansion. Wired CX. Ballooned CX with 2.0.   Stented with 3.5x18

## 2019-08-31 NOTE — PROGRESS NOTES
[facility] PULMONARY/CRITICAL CARE    S: Pt underwent cardiac cath w/ PCI yesterday. He has some dyspnea on exertion, but none at rest. He's not coughing much.      Meds:  • ipratropium-albuterol  3 mL Nebulization Q6H WA   • dilTIAZem HCl  30 mg Oral BID   • Clop 0. 89 0.99   GFRAA 90   < > 91 96 85   GFRNAA 77   < > 78 83 74   CA 8.1*   < > 8.4* 8.1* 7.9*   ALB 3.1*  --   --   --   --    *   < > 139 137 135*   K  --    < > 4.5 4.4 4.4   CL 99   < > 102 104 99   CO2 27.0   < > 30.0 27.0 28.0   ALKPHO 73  --

## 2019-08-31 NOTE — PROGRESS NOTES
Ambulated pt in the flowers this morning. Pt a little weak. Tolerated fairly well. Cont. to monitor pt.

## 2019-08-31 NOTE — PROGRESS NOTES
Pt unable to void. Bladder scan done @ 1244 showed >367. Pt feels the urge but unable to void. Straight cath done--400 cc out. After removing the cath- able to void 50 cc. Cont. to monitor.

## 2019-08-31 NOTE — PROGRESS NOTES
S/p pci. Pt unable to void. Bladder scan showed >200. Pt stated feels the urge but unable to urinate. Straight cath done- got 800 out. Sl.oozing noted on rt groin. Manual pressure held. quickclot applied. Pt restless, anxious wants to get up.  Explained to

## 2019-08-31 NOTE — PROCEDURES
[address]    PATIENT'S NAME: Kishan Nicole Aaron BARROW   ATTENDING PHYSICIAN: Jacklyn Durant MD   OPERATING PHYSICIAN: Niesha Fraser.  Shahnaz Dotson MD   PATIENT ACCOUNT#:   [id number]    LOCATION:  [address] [place] [address]  MEDICAL RECORD #:   [id number]       DATE OF BIRTH:  [birth year] performed intravascular ultrasound of the proximal LAD and the distal left main. The vessel caliber was approximately 3.5 mm. We, therefore, took a 3.5 x 18 mm Leon drug-eluting stent and deployed it at 16 atmospheres.   We postdilated with a 3.5 mm nonco

## 2019-09-01 ENCOUNTER — ANESTHESIA EVENT (OUTPATIENT)
Dept: ENDOSCOPY | Facility: HOSPITAL | Age: 74
End: 2019-09-01

## 2019-09-01 ENCOUNTER — ANESTHESIA (OUTPATIENT)
Dept: ENDOSCOPY | Facility: HOSPITAL | Age: 74
End: 2019-09-01

## 2019-09-01 ENCOUNTER — APPOINTMENT (OUTPATIENT)
Dept: INTERVENTIONAL RADIOLOGY/VASCULAR | Facility: HOSPITAL | Age: 74
DRG: 246 | End: 2019-09-01
Attending: HOSPITALIST
Payer: MEDICARE

## 2019-09-01 LAB
ANION GAP SERPL CALC-SCNC: 12 MMOL/L (ref 0–18)
ANTIBODY SCREEN: NEGATIVE
APTT PPP: 126.2 SECONDS (ref 25.4–36.1)
APTT PPP: 31.3 SECONDS (ref 25.4–36.1)
ATRIAL RATE: 111 BPM
BASOPHILS # BLD: 0 X10(3) UL (ref 0–0.2)
BASOPHILS NFR BLD: 0 %
BUN BLD-MCNC: 110 MG/DL (ref 7–18)
BUN/CREAT SERPL: 79.1 (ref 10–20)
CALCIUM BLD-MCNC: 7.8 MG/DL (ref 8.5–10.1)
CHLORIDE SERPL-SCNC: 99 MMOL/L (ref 98–112)
CO2 SERPL-SCNC: 22 MMOL/L (ref 21–32)
CREAT BLD-MCNC: 1.39 MG/DL (ref 0.7–1.3)
DEPRECATED RDW RBC AUTO: 54.4 FL (ref 35.1–46.3)
EOSINOPHIL # BLD: 0 X10(3) UL (ref 0–0.7)
EOSINOPHIL NFR BLD: 0 %
ERYTHROCYTE [DISTWIDTH] IN BLOOD BY AUTOMATED COUNT: 18 % (ref 11–15)
GLUCOSE BLD-MCNC: 152 MG/DL (ref 70–99)
HCT VFR BLD AUTO: 24 % (ref 39–53)
HCT VFR BLD AUTO: 26.5 % (ref 39–53)
HGB BLD-MCNC: 7.5 G/DL (ref 13–17.5)
HGB BLD-MCNC: 7.7 G/DL (ref 13–17.5)
HGB BLD-MCNC: 8.2 G/DL (ref 13–17.5)
HGB BLD-MCNC: 9.9 G/DL (ref 13–17.5)
LYMPHOCYTES NFR BLD: 0.76 X10(3) UL (ref 1–4)
LYMPHOCYTES NFR BLD: 4 %
MCH RBC QN AUTO: 26.1 PG (ref 26–34)
MCHC RBC AUTO-ENTMCNC: 30.9 G/DL (ref 31–37)
MCV RBC AUTO: 84.4 FL (ref 80–100)
MONOCYTES # BLD: 0.38 X10(3) UL (ref 0.1–1)
MONOCYTES NFR BLD: 2 %
MORPHOLOGY: NORMAL
NEUTROPHILS # BLD AUTO: 16.29 X10 (3) UL (ref 1.5–7.7)
NEUTROPHILS NFR BLD: 94 %
NEUTS HYPERSEG # BLD: 17.86 X10(3) UL (ref 1.5–7.7)
OSMOLALITY SERPL CALC.SUM OF ELEC: 314 MOSM/KG (ref 275–295)
P AXIS: 96 DEGREES
P-R INTERVAL: 172 MS
PLATELET # BLD AUTO: 167 10(3)UL (ref 150–450)
PLATELET MORPHOLOGY: NORMAL
POTASSIUM SERPL-SCNC: 4.1 MMOL/L (ref 3.5–5.1)
Q-T INTERVAL: 336 MS
QRS DURATION: 84 MS
QTC CALCULATION (BEZET): 456 MS
R AXIS: 61 DEGREES
RBC # BLD AUTO: 3.14 X10(6)UL (ref 3.8–5.8)
RH BLOOD TYPE: POSITIVE
SODIUM SERPL-SCNC: 133 MMOL/L (ref 136–145)
T AXIS: -54 DEGREES
TOTAL CELLS COUNTED: 100
VENTRICULAR RATE: 111 BPM
WBC # BLD AUTO: 19 X10(3) UL (ref 4–11)

## 2019-09-01 PROCEDURE — 85018 HEMOGLOBIN: CPT | Performed by: HOSPITALIST

## 2019-09-01 PROCEDURE — 94640 AIRWAY INHALATION TREATMENT: CPT

## 2019-09-01 PROCEDURE — 85007 BL SMEAR W/DIFF WBC COUNT: CPT | Performed by: INTERNAL MEDICINE

## 2019-09-01 PROCEDURE — 06H03DZ INSERTION OF INTRALUMINAL DEVICE INTO INFERIOR VENA CAVA, PERCUTANEOUS APPROACH: ICD-10-PCS | Performed by: RADIOLOGY

## 2019-09-01 PROCEDURE — 93005 ELECTROCARDIOGRAM TRACING: CPT

## 2019-09-01 PROCEDURE — 85049 AUTOMATED PLATELET COUNT: CPT | Performed by: INTERNAL MEDICINE

## 2019-09-01 PROCEDURE — 86901 BLOOD TYPING SEROLOGIC RH(D): CPT | Performed by: HOSPITALIST

## 2019-09-01 PROCEDURE — 80048 BASIC METABOLIC PNL TOTAL CA: CPT | Performed by: INTERNAL MEDICINE

## 2019-09-01 PROCEDURE — 85018 HEMOGLOBIN: CPT | Performed by: INTERNAL MEDICINE

## 2019-09-01 PROCEDURE — 85025 COMPLETE CBC W/AUTO DIFF WBC: CPT | Performed by: INTERNAL MEDICINE

## 2019-09-01 PROCEDURE — 85014 HEMATOCRIT: CPT | Performed by: HOSPITALIST

## 2019-09-01 PROCEDURE — B5191ZZ FLUOROSCOPY OF INFERIOR VENA CAVA USING LOW OSMOLAR CONTRAST: ICD-10-PCS | Performed by: RADIOLOGY

## 2019-09-01 PROCEDURE — 86850 RBC ANTIBODY SCREEN: CPT | Performed by: HOSPITALIST

## 2019-09-01 PROCEDURE — 85730 THROMBOPLASTIN TIME PARTIAL: CPT | Performed by: HOSPITALIST

## 2019-09-01 PROCEDURE — 86900 BLOOD TYPING SEROLOGIC ABO: CPT | Performed by: HOSPITALIST

## 2019-09-01 PROCEDURE — 86920 COMPATIBILITY TEST SPIN: CPT

## 2019-09-01 PROCEDURE — C9113 INJ PANTOPRAZOLE SODIUM, VIA: HCPCS | Performed by: HOSPITALIST

## 2019-09-01 PROCEDURE — 36430 TRANSFUSION BLD/BLD COMPNT: CPT

## 2019-09-01 PROCEDURE — 93010 ELECTROCARDIOGRAM REPORT: CPT | Performed by: INTERNAL MEDICINE

## 2019-09-01 PROCEDURE — 37191 INS ENDOVAS VENA CAVA FILTR: CPT

## 2019-09-01 PROCEDURE — 0W3P8ZZ CONTROL BLEEDING IN GASTROINTESTINAL TRACT, VIA NATURAL OR ARTIFICIAL OPENING ENDOSCOPIC: ICD-10-PCS | Performed by: INTERNAL MEDICINE

## 2019-09-01 PROCEDURE — 85027 COMPLETE CBC AUTOMATED: CPT | Performed by: INTERNAL MEDICINE

## 2019-09-01 RX ORDER — SODIUM CHLORIDE 9 MG/ML
INJECTION, SOLUTION INTRAVENOUS ONCE
Status: COMPLETED | OUTPATIENT
Start: 2019-09-01 | End: 2019-09-01

## 2019-09-01 RX ORDER — MIDAZOLAM HYDROCHLORIDE 1 MG/ML
INJECTION INTRAMUSCULAR; INTRAVENOUS
Status: COMPLETED
Start: 2019-09-01 | End: 2019-09-01

## 2019-09-01 RX ORDER — SODIUM CHLORIDE 9 MG/ML
INJECTION, SOLUTION INTRAVENOUS CONTINUOUS
Status: DISCONTINUED | OUTPATIENT
Start: 2019-09-01 | End: 2019-09-03

## 2019-09-01 RX ORDER — LIDOCAINE HYDROCHLORIDE 10 MG/ML
INJECTION, SOLUTION INFILTRATION; PERINEURAL
Status: COMPLETED
Start: 2019-09-01 | End: 2019-09-01

## 2019-09-01 RX ORDER — HEPARIN SODIUM 5000 [USP'U]/ML
INJECTION, SOLUTION INTRAVENOUS; SUBCUTANEOUS
Status: COMPLETED
Start: 2019-09-01 | End: 2019-09-01

## 2019-09-01 NOTE — CONSULTS
[facility]  Report of Consultation    Comfort Delgado Patient Status:  Inpatient    1945 MRN [id number]   [facility] 6NE-A Attending Gwenette Curling, MD   Hosp Day # 5 PCP Joel Waldron MD     Reason for Consultation:  Melena.  GI bleed (MYCOSTATIN) suspension 500,000 Units, 5 mL, Oral, QID  •  Clopidogrel Bisulfate (PLAVIX) tab 75 mg, 75 mg, Oral, Daily  •  aspirin EC tab 81 mg, 81 mg, Oral, Daily  •  Senna-Docusate Sodium (SENOKOT S) 8.6-50 MG tab 2 tablet, 2 tablet, Oral, Daily PRN  • Status post thoracotomy     Malignant neoplasm of upper lobe of right lung (HCC)     Malignant neoplasm of right lung, unspecified part of lung (HCC)     Essential hypertension     Chronic bilateral low back pain without sciatica     Chronic obstructive pu

## 2019-09-01 NOTE — ANESTHESIA POSTPROCEDURE EVALUATION
[address] [facility] Patient Status:  Inpatient   Age/Gender 66year old male MRN [id number]   [facility] 6NE-A Attending Stoney Torre MD   Hosp Day # 5 PCP Александр Rutledge MD       Anesthesia Post-op Note    Procedure(s):  Esophag

## 2019-09-01 NOTE — OPERATIVE REPORT
EGD Operative Report    Faviola Tracy Patient Status:  Inpatient    1945 MRN [id number]   Location EDW complications and was transferred to the recovery area in stable condition. Findings:   Esophagus: Healing erosive esophagitis is seen in the distal esophagus. Z line was seen at 40 cm. Stomach: Old blood was seen in the stomach.  At the incisura was seen

## 2019-09-01 NOTE — ANESTHESIA PREPROCEDURE EVALUATION
PRE-OP EVALUATION    Patient Name: Nikolai Johnson    Pre-op Diagnosis: in patient    Procedure(s):  Esophagogastroduodenoscopy at the bedside    Surgeon(s) and Role:     * Joselito Hill MD - Primary    Pre-op vitals reviewed. Temp: 97.5 °F (36. [COMPLETED] heparin sodium 1000 UNIT/ML injection      [COMPLETED] heparin sodium 1000 UNIT/ML injection      [COMPLETED] Clopidogrel Bisulfate (PLAVIX) 75 MG tab      [] 0.9% NaCl infusion  Intravenous Continuous   Clopidogrel Bisulfate (PLAVIX the lungs daily. Disp:  Rfl:        Allergies: Patient has no known allergies.       Anesthesia Evaluation        Anesthetic Complications           GI/Hepatic/Renal  Comment: GI Bleed                               Cardiovascular  Comment: COPD:with acute e Pulmonary            (+) decreased breath sounds         Other findings            ASA: 4   Plan: MAC  NPO status verified and patient meets guidelines.           Plan/risks discussed with: patient                Present on Admission:  **None**

## 2019-09-01 NOTE — PLAN OF CARE
Assumed care of patient as transfer from CTU. Pt alert and oriented x4, fc, FAIRCHILD. Pt diaphoretic, pale, hypotensive. Per MD orders, 1 u PRBC transfused Repeat Hgb 7.5 after completion. 1 additional unit PRBC completed, repeat Hgb after completion 9.9.  Pt we

## 2019-09-01 NOTE — PROCEDURES
[address]00 [facility] Patient Status:  Inpatient    1945 MRN [id number]   Location [facility] [address] [facility] Attending Kamran Carlin MD   Hosp Day # 5 PCP Marlene Salazar MD         Brief Procedure Report    Pre-Operative [place] Samples

## 2019-09-01 NOTE — PLAN OF CARE
Alert. Oriented. Sr per tele. Hr 90's. 2+ edema RLE.(+)dvt. On hep gtt. Pt w/ oral thrush?- nystatin ordered. Up ad bull. Voiding per bathroom. Poc updated and discussed with patient and daughter. Cont. to monitor pt.

## 2019-09-01 NOTE — PROGRESS NOTES
[facility] PULMONARY/CRITICAL CARE    S: Pt had a large black tarry stool this am. His hemoglobin dropped from 11.6 to 8.2. The heparin was stopped. IR was consulted an an IVC filter was inserted this am. He was transferred to the ICU. Pt has no complaints.  Mohan Baptiste 17.8* 18.0*  --   --    NEPRELIM 11.01* 9.97*  --   --  16.29*  --   --    WBC 12.0* 10.9 11.3* 15.9* 19.0*  --   --    .0* 188.0 180.0 188.0 167.0  --   --      Recent Labs   Lab 08/28/19  0504  08/31/19  0533 08/31/19  1226 09/01/19  0456   GLU 12

## 2019-09-01 NOTE — PROGRESS NOTES
[facility] Cardiology Progress Note        Maxcine Bloodgood Patient Status:  Inpatient    1945 MRN [id number]   [facility] 8NE-A Attending Bettina Lauren MD   Hosp Day # 5 PCP Matthew Ayala MD     Subjective:  Erich Presley bilaterally. RLE swollen. Neurologic: no focal deficits  Skin: Warm and dry. Telemetry: PAF - now AF. Cardiac Cath:  PCI to ULM.    7F, LCFA.     BMW in LAD, rota floppy, 1.75mm gregorio. Ballooned with 2.5 exchange balloon. Then 3.0mm NC.   Full ex as able  6. CCB for rate control. Avoiding BB given COPD / bronchospasm  7. GI assessment. F/U H+H per GI protocol. Monitor for transfusion needs. 8.  Suspect AF noted in the setting of stressors of GI bleed / COPD.   Pending GI assessment and stabi

## 2019-09-01 NOTE — PROGRESS NOTES
[facility] Hospitalist Progress Note     [facility]      SUBJECTIVE:  - This morning with large black tarry stool  - Hgb 11.6 --> 8.2,   - Dyspneic with activity  - HDS currently    OBJECTIVE:  Temp:  [97.2 °F (36.2 °C)-98.6 °F (37 °C)] 97.6 °F (36. gray-scale and duplex ultrasound was used to evaluate the bilateral extracranial carotid and vertebral arteries. B-mode 2-dimensional images of the vascular structures, Doppler spectral analysis, and color flow Doppler imaging were  performed.   Stenosis m Hyperexpansion of the lungs. No pneumothorax. CONCLUSION:  Small right-sided pleural effusion with right basilar atelectasis/infiltrates.     Dictated by: Jose G Oscar MD on 8/27/2019 at 17:34     Approved by: Jose G Oscar MD               Med started on 8/31 for acute RLE DVT, also on HD steroids for COPD  - Hold heparin  - IV PPI  - GI consulted  - NPO for likely EGD today    # Acute DVT RLE  - Started on heparin on 8/31 now with acute GI bleed  - Will need filter  - Will contact IR this AM

## 2019-09-01 NOTE — PLAN OF CARE
Patient complains of dyspnea this morning after bowel movement. Oxygen saturation 90-92%. Placed patient on 2L O2 NC. Patient verbalized relief of dyspnea after O2 applied and oxygen saturation increased to 96%.  This RN found patient's stool to be black an

## 2019-09-01 NOTE — PROCEDURES
[facility]  Pre-Procedure Note    Name: Oma Verma  MRN#: [id number]  : 1945    Procedure:  IVC filter placement    Indication: DVT and GI bleed    Allergies:    No Known Allergies    Pertinent Medications:    Is patient on any Aspirin, Cou

## 2019-09-02 LAB
ANION GAP SERPL CALC-SCNC: 6 MMOL/L (ref 0–18)
BASOPHILS # BLD: 0 X10(3) UL (ref 0–0.2)
BASOPHILS NFR BLD: 0 %
BLOOD TYPE BARCODE: 6200
BLOOD TYPE BARCODE: 6200
BUN BLD-MCNC: 84 MG/DL (ref 7–18)
BUN/CREAT SERPL: 84.8 (ref 10–20)
CALCIUM BLD-MCNC: 7.5 MG/DL (ref 8.5–10.1)
CHLORIDE SERPL-SCNC: 107 MMOL/L (ref 98–112)
CO2 SERPL-SCNC: 20 MMOL/L (ref 21–32)
CREAT BLD-MCNC: 0.99 MG/DL (ref 0.7–1.3)
DEPRECATED RDW RBC AUTO: 54.1 FL (ref 35.1–46.3)
EOSINOPHIL # BLD: 0 X10(3) UL (ref 0–0.7)
EOSINOPHIL NFR BLD: 0 %
ERYTHROCYTE [DISTWIDTH] IN BLOOD BY AUTOMATED COUNT: 17.3 % (ref 11–15)
GLUCOSE BLD-MCNC: 126 MG/DL (ref 70–99)
HCT VFR BLD AUTO: 25.8 % (ref 39–53)
HGB BLD-MCNC: 8.4 G/DL (ref 13–17.5)
HGB BLD-MCNC: 8.6 G/DL (ref 13–17.5)
HGB BLD-MCNC: 8.7 G/DL (ref 13–17.5)
HGB BLD-MCNC: 8.7 G/DL (ref 13–17.5)
LYMPHOCYTES NFR BLD: 0.93 X10(3) UL (ref 1–4)
LYMPHOCYTES NFR BLD: 5 %
MCH RBC QN AUTO: 28.7 PG (ref 26–34)
MCHC RBC AUTO-ENTMCNC: 33.3 G/DL (ref 31–37)
MCV RBC AUTO: 86 FL (ref 80–100)
MONOCYTES # BLD: 0 X10(3) UL (ref 0.1–1)
MONOCYTES NFR BLD: 0 %
MORPHOLOGY: NORMAL
NEUTROPHILS # BLD AUTO: 16.1 X10 (3) UL (ref 1.5–7.7)
NEUTROPHILS NFR BLD: 94 %
NEUTS BAND NFR BLD: 1 %
NEUTS HYPERSEG # BLD: 17.67 X10(3) UL (ref 1.5–7.7)
OSMOLALITY SERPL CALC.SUM OF ELEC: 303 MOSM/KG (ref 275–295)
PLATELET # BLD AUTO: 105 10(3)UL (ref 150–450)
PLATELET MORPHOLOGY: NORMAL
POTASSIUM SERPL-SCNC: 4.3 MMOL/L (ref 3.5–5.1)
RBC # BLD AUTO: 3 X10(6)UL (ref 3.8–5.8)
SODIUM SERPL-SCNC: 133 MMOL/L (ref 136–145)
TOTAL CELLS COUNTED: 100
WBC # BLD AUTO: 18.6 X10(3) UL (ref 4–11)

## 2019-09-02 PROCEDURE — 85007 BL SMEAR W/DIFF WBC COUNT: CPT | Performed by: INTERNAL MEDICINE

## 2019-09-02 PROCEDURE — 85049 AUTOMATED PLATELET COUNT: CPT | Performed by: INTERNAL MEDICINE

## 2019-09-02 PROCEDURE — 85018 HEMOGLOBIN: CPT | Performed by: INTERNAL MEDICINE

## 2019-09-02 PROCEDURE — C9113 INJ PANTOPRAZOLE SODIUM, VIA: HCPCS | Performed by: HOSPITALIST

## 2019-09-02 PROCEDURE — 80048 BASIC METABOLIC PNL TOTAL CA: CPT | Performed by: INTERNAL MEDICINE

## 2019-09-02 PROCEDURE — 84132 ASSAY OF SERUM POTASSIUM: CPT | Performed by: INTERNAL MEDICINE

## 2019-09-02 PROCEDURE — 85027 COMPLETE CBC AUTOMATED: CPT | Performed by: INTERNAL MEDICINE

## 2019-09-02 PROCEDURE — 94640 AIRWAY INHALATION TREATMENT: CPT

## 2019-09-02 PROCEDURE — 85025 COMPLETE CBC W/AUTO DIFF WBC: CPT | Performed by: INTERNAL MEDICINE

## 2019-09-02 RX ORDER — IPRATROPIUM BROMIDE AND ALBUTEROL SULFATE 2.5; .5 MG/3ML; MG/3ML
3 SOLUTION RESPIRATORY (INHALATION) EVERY 4 HOURS PRN
Status: DISCONTINUED | OUTPATIENT
Start: 2019-09-02 | End: 2019-09-05

## 2019-09-02 RX ORDER — METHYLPREDNISOLONE SODIUM SUCCINATE 40 MG/ML
40 INJECTION, POWDER, LYOPHILIZED, FOR SOLUTION INTRAMUSCULAR; INTRAVENOUS EVERY 12 HOURS
Status: DISCONTINUED | OUTPATIENT
Start: 2019-09-02 | End: 2019-09-03

## 2019-09-02 NOTE — PROGRESS NOTES
[facility] Patient Status:  Inpatient    1945 MRN [id number]   [facility] 6NE-A Attending Rubi Nix MD   [facility] Day # 6 PCP Marshall Abbott MD     Critical Care Progress Note     Date of Admission: 2019  2:00 On room air      Wt Readings from Last 3 Encounters:  08/31/19 : 211 lb 3.2 oz (95.8 kg)  06/15/19 : 214 lb (97.1 kg)  03/12/19 : 216 lb (98 kg)       I/O last 3 completed shifts: In: 2036 [P.O.:500;  I.V.:1010; Blood:526]  Out: 1000 [Urine:1000]  I/O th code  -CCU  -will follow    Chandrika Sena MD  9/2/2019  6:13 AM

## 2019-09-02 NOTE — PROGRESS NOTES
[facility] Cardiology Progress Note        Curlyneftali Martienz Patient Status:  Inpatient    1945 MRN [id number]   [facility] 8NE-A Attending Emery Oliver MD   Hosp Day # 6 PCP Gaby Downey MD     Subjective:  He had bilaterally. RLE swollen. Neurologic: no focal deficits  Skin: Warm and dry. Telemetry: PAF - now AF. Cardiac Cath:  PCI to ULM.    7F, LCFA.     BMW in LAD, rota floppy, 1.75mm greogrio. Ballooned with 2.5 exchange balloon. Then 3.0mm NC.   Full ex PRBC's received. Hb stable this am.        Plan:    He has a lot going on, but hopefully will do ok from here on in. Continue ASA and plavix. Follow tele. Would begin coumadin as soon as OK with GI, allow INR to drift up. Mobilize. Diet per GI.   C

## 2019-09-02 NOTE — PLAN OF CARE
Pt denies pain to R leg/groin. BLE edema, R>L. Pedal and post tib pulses by doppler, cool extremities, +cap refill.  Tolerated walking in flowers on RA, states slightly more dyspneic and longer recovery than normal. No melena, cl liq diet continued, pt c/o julius

## 2019-09-02 NOTE — PLAN OF CARE
Assumed care of patient at 1. Patient is AOX4, following commands, and FAIRCHILD. Patient denies SOB, nausea, dizziness, or pain.  Patient helped back into bed from chair with standby assist. Patient states he feels a little \"foggy\" and family at bedside sta

## 2019-09-02 NOTE — PROGRESS NOTES
[facility] Hospitalist Progress Note     [facility]      SUBJECTIVE:  - Stable overnight    OBJECTIVE:  Temp:  [97.2 °F (36.2 °C)-97.8 °F (36.6 °C)] 97.6 °F (36.4 °C)  Pulse:  [] 76  Resp:  [8-26] 13  BP: ()/(34-83) 112/49    Intake/Output: input(s): PGLU in the last 168 hours. Imaging:  Us Carotid Doppler Bilat - Diag Img (cpt=93880)    Result Date: 8/27/2019  PROCEDURE:  US CAROTID DOPPLER BILAT - DIAG IMG (CPT=93880)  COMPARISON:  None.   INDICATIONS:  pre op CABG  TECHNIQUE:  Real-time radiograph was obtained. COMPARISON:  None. INDICATIONS:  SOB  PATIENT STATED HISTORY: (As transcribed by Technologist)     FINDINGS:  Cardiac size is within normal limits. Small right-sided pleural effusion with right basilar atelectasis/infiltrates. 33 Kourtney Boyle for evaluation for CABG. # CAD  - severe LM disease  - CV surgery evaluating for CABG- decision made for PCI to LM, sp PCI on 8/30, has residual disease in the RCA. Plan for staged PCI to RCA in 4 weeks.   - ASA, plavix - very high

## 2019-09-03 LAB
ANION GAP SERPL CALC-SCNC: 6 MMOL/L (ref 0–18)
BASOPHILS # BLD: 0 X10(3) UL (ref 0–0.2)
BASOPHILS NFR BLD: 0 %
BUN BLD-MCNC: 38 MG/DL (ref 7–18)
BUN/CREAT SERPL: 46.3 (ref 10–20)
CALCIUM BLD-MCNC: 7.5 MG/DL (ref 8.5–10.1)
CHLORIDE SERPL-SCNC: 107 MMOL/L (ref 98–112)
CO2 SERPL-SCNC: 26 MMOL/L (ref 21–32)
CREAT BLD-MCNC: 0.82 MG/DL (ref 0.7–1.3)
DEPRECATED RDW RBC AUTO: 53.1 FL (ref 35.1–46.3)
EOSINOPHIL # BLD: 0 X10(3) UL (ref 0–0.7)
EOSINOPHIL NFR BLD: 0 %
ERYTHROCYTE [DISTWIDTH] IN BLOOD BY AUTOMATED COUNT: 17.3 % (ref 11–15)
GLUCOSE BLD-MCNC: 111 MG/DL (ref 70–99)
HCT VFR BLD AUTO: 28.2 % (ref 39–53)
HGB BLD-MCNC: 9 G/DL (ref 13–17.5)
HGB BLD-MCNC: 9.5 G/DL (ref 13–17.5)
LYMPHOCYTES NFR BLD: 0.71 X10(3) UL (ref 1–4)
LYMPHOCYTES NFR BLD: 4 %
MCH RBC QN AUTO: 27.4 PG (ref 26–34)
MCHC RBC AUTO-ENTMCNC: 31.9 G/DL (ref 31–37)
MCV RBC AUTO: 86 FL (ref 80–100)
MONOCYTES # BLD: 0.89 X10(3) UL (ref 0.1–1)
MONOCYTES NFR BLD: 5 %
MORPHOLOGY: NORMAL
NEUTROPHILS # BLD AUTO: 14.63 X10 (3) UL (ref 1.5–7.7)
NEUTROPHILS NFR BLD: 91 %
NEUTS HYPERSEG # BLD: 16.11 X10(3) UL (ref 1.5–7.7)
OSMOLALITY SERPL CALC.SUM OF ELEC: 298 MOSM/KG (ref 275–295)
PLATELET # BLD AUTO: 126 10(3)UL (ref 150–450)
PLATELET MORPHOLOGY: NORMAL
POTASSIUM SERPL-SCNC: 4.8 MMOL/L (ref 3.5–5.1)
RBC # BLD AUTO: 3.28 X10(6)UL (ref 3.8–5.8)
SODIUM SERPL-SCNC: 139 MMOL/L (ref 136–145)
TOTAL CELLS COUNTED: 100
WBC # BLD AUTO: 17.7 X10(3) UL (ref 4–11)

## 2019-09-03 PROCEDURE — 85007 BL SMEAR W/DIFF WBC COUNT: CPT | Performed by: INTERNAL MEDICINE

## 2019-09-03 PROCEDURE — C9113 INJ PANTOPRAZOLE SODIUM, VIA: HCPCS | Performed by: HOSPITALIST

## 2019-09-03 PROCEDURE — 85018 HEMOGLOBIN: CPT | Performed by: INTERNAL MEDICINE

## 2019-09-03 PROCEDURE — 85025 COMPLETE CBC W/AUTO DIFF WBC: CPT | Performed by: INTERNAL MEDICINE

## 2019-09-03 PROCEDURE — 85027 COMPLETE CBC AUTOMATED: CPT | Performed by: INTERNAL MEDICINE

## 2019-09-03 PROCEDURE — 80048 BASIC METABOLIC PNL TOTAL CA: CPT | Performed by: INTERNAL MEDICINE

## 2019-09-03 RX ORDER — WARFARIN SODIUM 5 MG/1
5 TABLET ORAL NIGHTLY
Status: DISCONTINUED | OUTPATIENT
Start: 2019-09-03 | End: 2019-09-05

## 2019-09-03 RX ORDER — PREDNISONE 20 MG/1
20 TABLET ORAL
Status: DISCONTINUED | OUTPATIENT
Start: 2019-09-03 | End: 2019-09-05

## 2019-09-03 NOTE — PROGRESS NOTES
[facility] Cardiology Progress Note        Clementeen Cancel Patient Status:  Inpatient    1945 MRN [id number]   [facility] [place] 8NE-A Attending Sreekanth Canales MD   Hosp Day # 7 PCP Rosibel Ibarra MD     Subjective:     In be Warm and dry. Telemetry: PAF - now AF. Cardiac Cath:  PCI to ULM.    7F, LCFA.     BMW in LAD, rota floppy, 1.75mm gregorio. Ballooned with 2.5 exchange balloon. Then 3.0mm NC. Full expansion. Wired CX. Ballooned CX with 2.0.   Stented with 3.5x18m VASC = 4  9. UGI bleed with gastric ulcer found at EGD 9/1/19. Cautery performed. PRBC's received. Hb stable this am.        Plan:    He has a lot going on, but hopefully will do ok from here on in. Continue ASA and plavix. Coumadin 5 mg qhs.    Mobil

## 2019-09-03 NOTE — HOME CARE LIAISON
Met with patient at the bedside. Patient is agreeable to [facility]. Residential brochure provided with contact information. All questions addressed and answered.

## 2019-09-03 NOTE — PROGRESS NOTES
[facility]  Progress Note    Corky Rice Patient Status:  Inpatient    1945 MRN [id number]   [facility] 6NE-A Attending Sloan Loyola MD   [facility] Day # 7 PCP Diandra Ojeda MD     Subjective:  Corky Rice is a(n) 66year old ma disease, unspecified COPD type [facility]     Insomnia, unspecified type      1 gastric ulcer w/ bleeding, s/p cautery/injection during EGD w/ Dr Lucretia Dickson 9/1  2 acute blood loss anemia    Prior outpt gi had egd w/out hpylalexandru 3/2018 (had gastritis).  Likely this ul

## 2019-09-03 NOTE — PROGRESS NOTES
[facility] Hospitalist Progress Note     [facility]      SUBJECTIVE:  - Stable overnight    OBJECTIVE:  Temp:  [97.1 °F (36.2 °C)-98 °F (36.7 °C)] 97.7 °F (36.5 °C)  Pulse:  [59-74] 63  Resp:  [15-24] 20  BP: (112-147)/(39-64) 131/61    Intake/Output:    I 168 hours. Imaging:  Us Carotid Doppler Bilat - Diag Img (cpt=93880)    Result Date: 8/27/2019  PROCEDURE:  US CAROTID DOPPLER BILAT - DIAG IMG (CPT=93880)  COMPARISON:  None.   INDICATIONS:  pre op CABG  TECHNIQUE:  Real-time gray-scale and duplex ultra COMPARISON:  None. INDICATIONS:  SOB  PATIENT STATED HISTORY: (As transcribed by Technologist)     FINDINGS:  Cardiac size is within normal limits. Small right-sided pleural effusion with right basilar atelectasis/infiltrates.   Hyperexpansion of the lung CABG- decision made for PCI to LM, sp PCI on 8/30, has residual disease in the RCA. Plan for staged PCI to RCA in 4 weeks.   - ASA, plavix - very high risk to hold in light of recent PCI to LM 2 days ago- continue     # Acute GI bleed  - SP EGD with 9/1 wit

## 2019-09-03 NOTE — PROGRESS NOTES
Assumed care of pt. At [address]. Pt. Sitting up in chair w/ family at bedside. VSS, denies any pain, SOB, numbness/tingling. Pt. Ambulating appropriately on RA w/ some dyspnea. Voiding appropriately, no BM per shift, will receive stool sample if able.  Linh Maze

## 2019-09-03 NOTE — PROGRESS NOTES
[address] Patient Status:  Inpatient    1945 MRN [id number]   [facility] [place] 6NE-A Attending Rosanne Bond MD   [facility] Day # 7 PCP Glena Hashimoto, MD     Critical Care Progress Note     Date of Admission: 2019  2:00 lb (96.6 kg)  06/15/19 : 214 lb (97.1 kg)  03/12/19 : 216 lb (98 kg)       I/O last 3 completed shifts: In: 2115 [P.O.:900; I.V.:1215]  Out: 1650 [Urine:1650]  No intake/output data recorded.      General appearance: alert, appears stated age and cooperati

## 2019-09-03 NOTE — PLAN OF CARE
Received patient at 0730 awake and alert. No complaints of pain. Up in chair. Walked hallway legs feel weak. Patient dyspnea with activity and slightly wheeze. Repeat hemoglobin 9.0 at 8:00 this morning. GI okay to advance diet. No BM today.   Voiding respiratory status  - Assess for changes in mentation and behavior  - Position to facilitate oxygenation and minimize respiratory effort  - Oxygen supplementation based on oxygen saturation or ABGs  - Provide Smoking Cessation handout, if applicable  - Enc

## 2019-09-04 LAB
ANION GAP SERPL CALC-SCNC: 5 MMOL/L (ref 0–18)
BASOPHILS # BLD: 0 X10(3) UL (ref 0–0.2)
BASOPHILS NFR BLD: 0 %
BUN BLD-MCNC: 34 MG/DL (ref 7–18)
BUN/CREAT SERPL: 43.6 (ref 10–20)
CALCIUM BLD-MCNC: 7.4 MG/DL (ref 8.5–10.1)
CHLORIDE SERPL-SCNC: 105 MMOL/L (ref 98–112)
CO2 SERPL-SCNC: 26 MMOL/L (ref 21–32)
CREAT BLD-MCNC: 0.78 MG/DL (ref 0.7–1.3)
DEPRECATED RDW RBC AUTO: 52.4 FL (ref 35.1–46.3)
EOSINOPHIL # BLD: 0 X10(3) UL (ref 0–0.7)
EOSINOPHIL NFR BLD: 0 %
ERYTHROCYTE [DISTWIDTH] IN BLOOD BY AUTOMATED COUNT: 17.4 % (ref 11–15)
GLUCOSE BLD-MCNC: 99 MG/DL (ref 70–99)
HCT VFR BLD AUTO: 25.9 % (ref 39–53)
HGB BLD-MCNC: 8.3 G/DL (ref 13–17.5)
HGB BLD-MCNC: 8.9 G/DL (ref 13–17.5)
INR BLD: 1.27 (ref 0.9–1.1)
LYMPHOCYTES NFR BLD: 1.37 X10(3) UL (ref 1–4)
LYMPHOCYTES NFR BLD: 8 %
MCH RBC QN AUTO: 27.1 PG (ref 26–34)
MCHC RBC AUTO-ENTMCNC: 32 G/DL (ref 31–37)
MCV RBC AUTO: 84.6 FL (ref 80–100)
MONOCYTES # BLD: 0.17 X10(3) UL (ref 0.1–1)
MONOCYTES NFR BLD: 1 %
MORPHOLOGY: NORMAL
NEUTROPHILS # BLD AUTO: 13.67 X10 (3) UL (ref 1.5–7.7)
NEUTROPHILS NFR BLD: 88 %
NEUTS BAND NFR BLD: 3 %
NEUTS HYPERSEG # BLD: 15.56 X10(3) UL (ref 1.5–7.7)
OSMOLALITY SERPL CALC.SUM OF ELEC: 290 MOSM/KG (ref 275–295)
PLATELET # BLD AUTO: 123 10(3)UL (ref 150–450)
PLATELET MORPHOLOGY: NORMAL
POTASSIUM SERPL-SCNC: 4.5 MMOL/L (ref 3.5–5.1)
PSA SERPL DL<=0.01 NG/ML-MCNC: 16.5 SECONDS (ref 12.5–14.7)
RBC # BLD AUTO: 3.06 X10(6)UL (ref 3.8–5.8)
SODIUM SERPL-SCNC: 136 MMOL/L (ref 136–145)
TOTAL CELLS COUNTED: 100
WBC # BLD AUTO: 17.1 X10(3) UL (ref 4–11)

## 2019-09-04 PROCEDURE — 85018 HEMOGLOBIN: CPT | Performed by: INTERNAL MEDICINE

## 2019-09-04 PROCEDURE — 85025 COMPLETE CBC W/AUTO DIFF WBC: CPT | Performed by: INTERNAL MEDICINE

## 2019-09-04 PROCEDURE — 85007 BL SMEAR W/DIFF WBC COUNT: CPT | Performed by: INTERNAL MEDICINE

## 2019-09-04 PROCEDURE — 80048 BASIC METABOLIC PNL TOTAL CA: CPT | Performed by: INTERNAL MEDICINE

## 2019-09-04 PROCEDURE — 85027 COMPLETE CBC AUTOMATED: CPT | Performed by: INTERNAL MEDICINE

## 2019-09-04 PROCEDURE — 85610 PROTHROMBIN TIME: CPT | Performed by: INTERNAL MEDICINE

## 2019-09-04 PROCEDURE — C9113 INJ PANTOPRAZOLE SODIUM, VIA: HCPCS | Performed by: HOSPITALIST

## 2019-09-04 RX ORDER — PANTOPRAZOLE SODIUM 40 MG/1
40 TABLET, DELAYED RELEASE ORAL
Status: DISCONTINUED | OUTPATIENT
Start: 2019-09-04 | End: 2019-09-05

## 2019-09-04 RX ORDER — FUROSEMIDE 10 MG/ML
20 INJECTION INTRAMUSCULAR; INTRAVENOUS ONCE
Status: COMPLETED | OUTPATIENT
Start: 2019-09-04 | End: 2019-09-04

## 2019-09-04 NOTE — PROGRESS NOTES
[facility] Hospitalist Progress Note     [facility]      SUBJECTIVE:  - Some SOB with walking this AM and sore throat    OBJECTIVE:  Temp:  [97 °F (36.1 °C)-97.9 °F (36.6 °C)] 97 °F (36.1 °C)  Pulse:  [58-74] 61  Resp:  [14-23] 14  BP: (113-142)/(44-92) 11 hours.    Invalid input(s): ALPHOS, TBIL, DBIL, TPROT    No results for input(s): PGLU in the last 168 hours.     Imaging:  Us Carotid Doppler Bilat - Diag Img (cpt=93880)    Result Date: 8/27/2019  PROCEDURE:  US CAROTID DOPPLER BILAT - DIAG IMG (CPT=93880 8/27/2019  PROCEDURE:  XR CHEST AP PORTABLE  (CPT=71045)  TECHNIQUE:  AP chest radiograph was obtained. COMPARISON:  None. INDICATIONS:  SOB  PATIENT STATED HISTORY: (As transcribed by Technologist)     FINDINGS:  Cardiac size is within normal limits.   Starla Mcgowan evaluating for CABG- decision made for PCI to LM, sp PCI on 8/30, has residual disease in the RCA. Plan for staged PCI to RCA in 4 weeks.   - ASA, plavix - very high risk to hold in light of recent PCI to LM 2 days ago- continue     # Acute GI bleed  - SP E

## 2019-09-04 NOTE — PROGRESS NOTES
[facility]  Progress Note    Maxanita Bloodgood Patient Status:  Inpatient    1945 MRN [id number]   [facility] 6NE-A Attending Bettina Lauren MD   [address] Day # 8 PCP Matthew Ayala MD     Subjective:  Carolina Bloodgood is a(n) 66year old ma puff Inhalation Daily Donaldo Montez MD 1 puff at 09/04/19 0742   Fluticasone Furoate-Vilanterol (BREO ELLIPTA) 200-25 MCG/INH inhaler 1 puff 1 puff Inhalation Daily Donaldo Montez MD 1 puff at 09/04/19 0742         Objective:    /47 (B Chronic obstructive pulmonary disease, unspecified COPD type (HCC)     Insomnia, unspecified type      1 gastric ulcer w/ bleeding, s/p cautery/injection during EGD w/ Dr Renzo Nam 9/1  2 acute blood loss anemia    Prior outpt gi had egd w/out chio 3/2018 (

## 2019-09-04 NOTE — PLAN OF CARE
Received patient at 0730 awake and alert sitting up in chair. No complaint of pain. Up in bathroom and walking hallways dyspnea with activity. Repeat hemoglobin at 11:00 8.9 called to Dr. Radha Whitaker no new orders.   Dr Eris Galvez updated with condition orders oxygenation  Description  INTERVENTIONS:  - Assess for changes in respiratory status  - Assess for changes in mentation and behavior  - Position to facilitate oxygenation and minimize respiratory effort  - Oxygen supplementation based on oxygen saturation

## 2019-09-04 NOTE — DIETARY NOTE
[id number] [place] [address]     Admitting diagnosis:  CAD,multivessel disease    Ht:  177.8 cm  Wt: 96.4 kg (212 lb 8.4 oz). This is 128 % of IBW  Body mass index is 30.49 kg/m².   IBW: 75.5kg    Labs/Meds reviewed    Diet: Order

## 2019-09-04 NOTE — PROGRESS NOTES
Pulmonary Progress Note        NAME: Nikolai Johnson - ROOM: 0298/8681-B - MRN: [id number] - Age: 66year old - : 1945        Last 24hrs: No events overnight, sitting up at bedside, no issues w/ breathing    OBJECTIVE:   19  8818 HGB 8.6* 8.7* 8.4* 9.0* 9.5* 8.3*   MCV 86.0  --   --  86.0  --  84.6   .0*  --   --  126.0*  --  123.0*   BAND 1  --   --   --   --  3   INR  --   --   --   --   --  1.27*       Recent Labs     09/02/19  0418 09/02/19  0509 09/03/19  0751 09/04/1

## 2019-09-04 NOTE — PROGRESS NOTES
[facility] Cardiology Progress Note        Abdullahi Parker Patient Status:  Inpatient    1945 MRN [id number]   [facility] 8NE-A Attending Stoney Torre MD   Hosp Day # 8 PCP Александр Rutledge MD     Subjective:     In go dry.     Telemetry: PAF - now AF. Cardiac Cath:  PCI to ULM.    7F, LCFA.     BMW in LAD, rota floppy, 1.75mm gregorio. Ballooned with 2.5 exchange balloon. Then 3.0mm NC. Full expansion. Wired CX. Ballooned CX with 2.0. Stented with 3.5x18mm [place]. atrial fibrillation -    -PARAMJIT 2 VASC = 4  9. UGI bleed with gastric ulcer found at EGD 9/1/19. Cautery performed. PRBC's received. Hb stable this am.        Plan:    [address] Vania Cotto for CTU. Continue coumadin, ASA, plavix. Dose of IV lasix.   Same statin, metopro

## 2019-09-05 ENCOUNTER — APPOINTMENT (OUTPATIENT)
Dept: GENERAL RADIOLOGY | Facility: HOSPITAL | Age: 74
DRG: 246 | End: 2019-09-05
Attending: INTERNAL MEDICINE
Payer: MEDICARE

## 2019-09-05 VITALS
HEART RATE: 59 BPM | BODY MASS INDEX: 30 KG/M2 | SYSTOLIC BLOOD PRESSURE: 97 MMHG | WEIGHT: 210.75 LBS | DIASTOLIC BLOOD PRESSURE: 84 MMHG | RESPIRATION RATE: 18 BRPM | OXYGEN SATURATION: 99 % | TEMPERATURE: 98 F

## 2019-09-05 PROBLEM — I82.401 DEEP VEIN THROMBOSIS (DVT) OF RIGHT LOWER EXTREMITY, UNSPECIFIED CHRONICITY, UNSPECIFIED VEIN (HCC): Status: ACTIVE | Noted: 2019-09-05

## 2019-09-05 PROBLEM — Z79.01 MONITORING FOR LONG-TERM ANTICOAGULANT USE: Status: ACTIVE | Noted: 2019-09-05

## 2019-09-05 PROBLEM — Z51.81 MONITORING FOR LONG-TERM ANTICOAGULANT USE: Status: ACTIVE | Noted: 2019-09-05

## 2019-09-05 PROBLEM — I48.0 PAROXYSMAL A-FIB (HCC): Status: ACTIVE | Noted: 2019-09-05

## 2019-09-05 PROBLEM — I82.402 DEEP VEIN THROMBOSIS (DVT) OF LEFT LOWER EXTREMITY, UNSPECIFIED CHRONICITY, UNSPECIFIED VEIN (HCC): Status: ACTIVE | Noted: 2019-09-05

## 2019-09-05 LAB
ANION GAP SERPL CALC-SCNC: 6 MMOL/L (ref 0–18)
BASOPHILS # BLD AUTO: 0.04 X10(3) UL (ref 0–0.2)
BASOPHILS NFR BLD AUTO: 0.3 %
BUN BLD-MCNC: 29 MG/DL (ref 7–18)
BUN/CREAT SERPL: 30.9 (ref 10–20)
CALCIUM BLD-MCNC: 7.6 MG/DL (ref 8.5–10.1)
CHLORIDE SERPL-SCNC: 102 MMOL/L (ref 98–112)
CO2 SERPL-SCNC: 27 MMOL/L (ref 21–32)
CREAT BLD-MCNC: 0.94 MG/DL (ref 0.7–1.3)
DEPRECATED RDW RBC AUTO: 51.8 FL (ref 35.1–46.3)
EOSINOPHIL # BLD AUTO: 0.04 X10(3) UL (ref 0–0.7)
EOSINOPHIL NFR BLD AUTO: 0.3 %
ERYTHROCYTE [DISTWIDTH] IN BLOOD BY AUTOMATED COUNT: 17.8 % (ref 11–15)
GLUCOSE BLD-MCNC: 89 MG/DL (ref 70–99)
HCT VFR BLD AUTO: 25.6 % (ref 39–53)
HGB BLD-MCNC: 8.4 G/DL (ref 13–17.5)
IMM GRANULOCYTES # BLD AUTO: 0.48 X10(3) UL (ref 0–1)
IMM GRANULOCYTES NFR BLD: 3.2 %
INR BLD: 1.58 (ref 0.9–1.1)
LYMPHOCYTES # BLD AUTO: 1.33 X10(3) UL (ref 1–4)
LYMPHOCYTES NFR BLD AUTO: 9 %
MCH RBC QN AUTO: 27.6 PG (ref 26–34)
MCHC RBC AUTO-ENTMCNC: 32.8 G/DL (ref 31–37)
MCV RBC AUTO: 84.2 FL (ref 80–100)
MONOCYTES # BLD AUTO: 1.11 X10(3) UL (ref 0.1–1)
MONOCYTES NFR BLD AUTO: 7.5 %
NEUTROPHILS # BLD AUTO: 11.83 X10 (3) UL (ref 1.5–7.7)
NEUTROPHILS # BLD AUTO: 11.83 X10(3) UL (ref 1.5–7.7)
NEUTROPHILS NFR BLD AUTO: 79.7 %
OSMOLALITY SERPL CALC.SUM OF ELEC: 285 MOSM/KG (ref 275–295)
PLATELET # BLD AUTO: 124 10(3)UL (ref 150–450)
POTASSIUM SERPL-SCNC: 4.7 MMOL/L (ref 3.5–5.1)
PSA SERPL DL<=0.01 NG/ML-MCNC: 19.7 SECONDS (ref 12.5–14.7)
RBC # BLD AUTO: 3.04 X10(6)UL (ref 3.8–5.8)
SODIUM SERPL-SCNC: 135 MMOL/L (ref 136–145)
WBC # BLD AUTO: 14.8 X10(3) UL (ref 4–11)

## 2019-09-05 PROCEDURE — 85025 COMPLETE CBC W/AUTO DIFF WBC: CPT | Performed by: INTERNAL MEDICINE

## 2019-09-05 PROCEDURE — 71046 X-RAY EXAM CHEST 2 VIEWS: CPT | Performed by: INTERNAL MEDICINE

## 2019-09-05 PROCEDURE — 85610 PROTHROMBIN TIME: CPT | Performed by: INTERNAL MEDICINE

## 2019-09-05 PROCEDURE — 80048 BASIC METABOLIC PNL TOTAL CA: CPT | Performed by: INTERNAL MEDICINE

## 2019-09-05 RX ORDER — ATORVASTATIN CALCIUM 20 MG/1
20 TABLET, FILM COATED ORAL NIGHTLY
Qty: 90 TABLET | Refills: 3 | Status: SHIPPED | OUTPATIENT
Start: 2019-09-05 | End: 2020-01-10

## 2019-09-05 RX ORDER — FUROSEMIDE 20 MG/1
20 TABLET ORAL DAILY
Qty: 90 TABLET | Refills: 3 | Status: SHIPPED | OUTPATIENT
Start: 2019-09-05 | End: 2020-08-18

## 2019-09-05 RX ORDER — WARFARIN SODIUM 2.5 MG/1
2.5 TABLET ORAL NIGHTLY
Status: DISCONTINUED | OUTPATIENT
Start: 2019-09-05 | End: 2019-09-05

## 2019-09-05 RX ORDER — ASPIRIN 81 MG/1
81 TABLET ORAL DAILY
Refills: 0 | Status: SHIPPED | COMMUNITY
Start: 2019-09-06

## 2019-09-05 RX ORDER — FUROSEMIDE 10 MG/ML
20 INJECTION INTRAMUSCULAR; INTRAVENOUS ONCE
Status: DISCONTINUED | OUTPATIENT
Start: 2019-09-05 | End: 2019-09-05

## 2019-09-05 RX ORDER — FUROSEMIDE 10 MG/ML
40 INJECTION INTRAMUSCULAR; INTRAVENOUS ONCE
Status: COMPLETED | OUTPATIENT
Start: 2019-09-05 | End: 2019-09-05

## 2019-09-05 RX ORDER — WARFARIN SODIUM 2.5 MG/1
2.5 TABLET ORAL NIGHTLY
Qty: 90 TABLET | Refills: 0 | Status: SHIPPED | OUTPATIENT
Start: 2019-09-05 | End: 2019-10-24

## 2019-09-05 RX ORDER — CLOPIDOGREL BISULFATE 75 MG/1
75 TABLET ORAL DAILY
Qty: 90 TABLET | Refills: 3 | Status: SHIPPED | OUTPATIENT
Start: 2019-09-06 | End: 2020-08-18

## 2019-09-05 RX ORDER — PANTOPRAZOLE SODIUM 40 MG/1
40 TABLET, DELAYED RELEASE ORAL
Qty: 60 TABLET | Refills: 0 | Status: SHIPPED | OUTPATIENT
Start: 2019-09-05 | End: 2019-09-25

## 2019-09-05 NOTE — PROGRESS NOTES
[facility] [facility] Cardiology Progress Note        Renetta Rosa Patient Status:  Inpatient    1945 MRN [id number]   [facility] 8NE-A Attending Nesha Ha MD   Hosp Day # 9 PCP Rudy Grove MD     Subjective:     In go femoral cath site bilaterally. RLE 3+ edema. .1++ on left. Neurologic: no focal deficits  Skin: Warm and dry. Telemetry: PAF - now AF. Cardiac Cath:  PCI to UL.    7F, LCFA.     BMW in LAD, rota floppy, 1.75mm gregorio.   Ballooned with 2.5 exchange b bleed with gastric ulcer found at EGD 9/1/19. Cautery performed. PRBC's received. Hb stable this am.  5. HTN  6. HL  7. RLE DVT, found 8/31- IVC filter placed 9/1/19. A/c stopped due to GIB. Now on coumadin. Fair amount of edema, concerning.   8.  Pa

## 2019-09-05 NOTE — PLAN OF CARE
Patient a&ox4. [place]. SR on tele. Lungs diminished. Denies any pain. Voids per urinal. Up with standby assist. Daily weight. Labs in am. Patient updated on poc, questions answered. Will continue to monitor.

## 2019-09-05 NOTE — PROGRESS NOTES
[facility] [facility] Patient Status:  Inpatient    1945 MRN [id number]   [facility] 8NE-A Attending Sreekanth Canales MD   Hosp Day # 9 PCP Rosibel Ibarra MD     Pulm / Critical Care Progress Note     S: Pt states that he feels Lab 09/03/19  0751  09/04/19  0417 09/04/19  1050 09/05/19  0520   WBC 17.7*  --  17.1*  --  14.8*   HGB 9.0*   < > 8.3* 8.9* 8.4*   HCT 28.2*  --  25.9*  --  25.6*   .0*  --  123.0*  --  124.0*    < > = values in this interval not displayed.

## 2019-09-05 NOTE — PLAN OF CARE
RN SHIFT NOTE    Assumed care of pt at 0700. Pt denies pain. Pt is alert and oriented x 4. Pt is NSR on tele, S1 and S2 present and pt denies cardiac symptoms. Lung sounds clear/ diminished bilaterally. Abdomen soft and round. Pt had small BM today 9/5/19. broncho-pulmonary hygiene including cough, deep breathe, Incentive Spirometry  - Assess the need for suctioning and perform as needed  - Assess and instruct to report SOB or any respiratory difficulty  - Respiratory Therapy support as indicated  - Manage/a

## 2019-09-05 NOTE — DISCHARGE SUMMARY
General Medicine Discharge Summary     Patient ID:  Angela Schmitz  66year old  4/1/1945    Admit date: 8/27/2019    Discharge date and time:  9/5/19    Attending Physician: MD Casimiro Mae PO. Continue indefinitely or as long as on ASA per GI recs     # Acute DVT RLE  - Started on heparin on 8/31 now with acute GI bleed  - Filter placed on 9/1- retrievaable  - Can remove if tolerates re-introduction of AC in a few weeks     # Hx COPD with ac lungs daily. Umeclidinium Bromide 62.5 MCG/INH Inhalation Aerosol Powder, Breath Activated  Inhale 1 puff into the lungs daily.       CONTINUE these medications which have NOT CHANGED    Zolpidem Tartrate 10 MG Oral Tab  Take 1 tablet (10 mg total) by mo

## 2019-09-09 ENCOUNTER — LAB REQUISITION (OUTPATIENT)
Dept: LAB | Facility: HOSPITAL | Age: 74
End: 2019-09-09
Payer: MEDICARE

## 2019-09-09 DIAGNOSIS — Z48.812 ENCOUNTER FOR SURGICAL AFTERCARE FOLLOWING SURGERY OF CIRCULATORY SYSTEM: ICD-10-CM

## 2019-09-09 DIAGNOSIS — Z48.815 ENCOUNTER FOR SURGICAL AFTERCARE FOLLOWING SURGERY OF DIGESTIVE SYSTEM: ICD-10-CM

## 2019-09-09 LAB
ANION GAP SERPL CALC-SCNC: 8 MMOL/L (ref 0–18)
BUN BLD-MCNC: 11 MG/DL (ref 7–18)
BUN/CREAT SERPL: 12.2 (ref 10–20)
CALCIUM BLD-MCNC: 7.8 MG/DL (ref 8.5–10.1)
CHLORIDE SERPL-SCNC: 100 MMOL/L (ref 98–112)
CO2 SERPL-SCNC: 24 MMOL/L (ref 21–32)
CREAT BLD-MCNC: 0.9 MG/DL (ref 0.7–1.3)
DEPRECATED RDW RBC AUTO: 57.3 FL (ref 35.1–46.3)
ERYTHROCYTE [DISTWIDTH] IN BLOOD BY AUTOMATED COUNT: 18.3 % (ref 11–15)
GLUCOSE BLD-MCNC: 82 MG/DL (ref 70–99)
HCT VFR BLD AUTO: 25.3 % (ref 39–53)
HGB BLD-MCNC: 7.9 G/DL (ref 13–17.5)
MCH RBC QN AUTO: 27.5 PG (ref 26–34)
MCHC RBC AUTO-ENTMCNC: 31.2 G/DL (ref 31–37)
MCV RBC AUTO: 88.2 FL (ref 80–100)
OSMOLALITY SERPL CALC.SUM OF ELEC: 272 MOSM/KG (ref 275–295)
PLATELET # BLD AUTO: 152 10(3)UL (ref 150–450)
POTASSIUM SERPL-SCNC: 4.4 MMOL/L (ref 3.5–5.1)
RBC # BLD AUTO: 2.87 X10(6)UL (ref 3.8–5.8)
SODIUM SERPL-SCNC: 132 MMOL/L (ref 136–145)
WBC # BLD AUTO: 9.6 X10(3) UL (ref 4–11)

## 2019-09-09 PROCEDURE — 80048 BASIC METABOLIC PNL TOTAL CA: CPT | Performed by: INTERNAL MEDICINE

## 2019-09-09 PROCEDURE — 85027 COMPLETE CBC AUTOMATED: CPT | Performed by: INTERNAL MEDICINE

## 2019-09-11 PROBLEM — Z95.820 S/P ANGIOPLASTY WITH STENT: Status: ACTIVE | Noted: 2019-09-11

## 2019-11-15 ENCOUNTER — HOSPITAL ENCOUNTER (OUTPATIENT)
Dept: INTERVENTIONAL RADIOLOGY/VASCULAR | Facility: HOSPITAL | Age: 74
Discharge: HOME | End: 2019-11-15
Attending: INTERNAL MEDICINE | Admitting: INTERNAL MEDICINE
Payer: MEDICARE

## 2019-11-15 VITALS
RESPIRATION RATE: 17 BRPM | BODY MASS INDEX: 27.77 KG/M2 | HEART RATE: 60 BPM | SYSTOLIC BLOOD PRESSURE: 147 MMHG | HEIGHT: 70 IN | DIASTOLIC BLOOD PRESSURE: 48 MMHG | WEIGHT: 194 LBS | OXYGEN SATURATION: 97 % | TEMPERATURE: 97 F

## 2019-11-15 DIAGNOSIS — I25.10 CAD (CORONARY ARTERY DISEASE): ICD-10-CM

## 2019-11-15 DIAGNOSIS — Z91.89 AT RISK FOR BLEEDING: ICD-10-CM

## 2019-11-15 DIAGNOSIS — Z01.818 PREOP TESTING: Primary | ICD-10-CM

## 2019-11-15 PROCEDURE — 93454 CORONARY ARTERY ANGIO S&I: CPT

## 2019-11-15 PROCEDURE — 027035Z DILATION OF CORONARY ARTERY, ONE ARTERY WITH TWO DRUG-ELUTING INTRALUMINAL DEVICES, PERCUTANEOUS APPROACH: ICD-10-PCS | Performed by: INTERNAL MEDICINE

## 2019-11-15 PROCEDURE — 93571 IV DOP VEL&/PRESS C FLO 1ST: CPT

## 2019-11-15 PROCEDURE — 4A033BC MEASUREMENT OF ARTERIAL PRESSURE, CORONARY, PERCUTANEOUS APPROACH: ICD-10-PCS | Performed by: INTERNAL MEDICINE

## 2019-11-15 PROCEDURE — 99211 OFF/OP EST MAY X REQ PHY/QHP: CPT

## 2019-11-15 PROCEDURE — 93010 ELECTROCARDIOGRAM REPORT: CPT | Performed by: INTERNAL MEDICINE

## 2019-11-15 PROCEDURE — 93005 ELECTROCARDIOGRAM TRACING: CPT

## 2019-11-15 PROCEDURE — 85610 PROTHROMBIN TIME: CPT

## 2019-11-15 PROCEDURE — 99152 MOD SED SAME PHYS/QHP 5/>YRS: CPT

## 2019-11-15 PROCEDURE — B2111ZZ FLUOROSCOPY OF MULTIPLE CORONARY ARTERIES USING LOW OSMOLAR CONTRAST: ICD-10-PCS | Performed by: INTERNAL MEDICINE

## 2019-11-15 PROCEDURE — 99153 MOD SED SAME PHYS/QHP EA: CPT

## 2019-11-15 RX ORDER — MIDAZOLAM HYDROCHLORIDE 1 MG/ML
INJECTION INTRAMUSCULAR; INTRAVENOUS
Status: COMPLETED
Start: 2019-11-15 | End: 2019-11-15

## 2019-11-15 RX ORDER — LIDOCAINE HYDROCHLORIDE 10 MG/ML
INJECTION, SOLUTION EPIDURAL; INFILTRATION; INTRACAUDAL; PERINEURAL
Status: COMPLETED
Start: 2019-11-15 | End: 2019-11-15

## 2019-11-15 RX ORDER — HEPARIN SODIUM 5000 [USP'U]/ML
INJECTION, SOLUTION INTRAVENOUS; SUBCUTANEOUS
Status: COMPLETED
Start: 2019-11-15 | End: 2019-11-15

## 2019-11-15 RX ORDER — ADENOSINE 3 MG/ML
INJECTION, SOLUTION INTRAVENOUS
Status: COMPLETED
Start: 2019-11-15 | End: 2019-11-15

## 2019-11-15 RX ORDER — ASPIRIN 81 MG/1
81 TABLET ORAL DAILY
Status: DISCONTINUED | OUTPATIENT
Start: 2019-11-16 | End: 2019-11-15

## 2019-11-15 RX ORDER — SODIUM CHLORIDE 9 MG/ML
INJECTION, SOLUTION INTRAVENOUS CONTINUOUS
Status: DISCONTINUED | OUTPATIENT
Start: 2019-11-15 | End: 2019-11-15

## 2019-11-15 RX ORDER — SODIUM CHLORIDE 9 MG/ML
INJECTION, SOLUTION INTRAVENOUS
Status: DISCONTINUED | OUTPATIENT
Start: 2019-11-16 | End: 2019-11-15 | Stop reason: HOSPADM

## 2019-11-15 RX ORDER — CLOPIDOGREL BISULFATE 75 MG/1
75 TABLET ORAL DAILY
Status: DISCONTINUED | OUTPATIENT
Start: 2019-11-16 | End: 2019-11-15

## 2019-11-15 NOTE — CARDIAC REHAB
CAD education completed with pt and family, phase 2 CR info given for both [employer] as well as [employer].

## 2019-11-15 NOTE — PROGRESS NOTES
Pt A&O x 3 and ready to go home. VSS on RA. Pt's right groin dressing c/d/i with no signs of bleeding or hematoma. After ambulation, pt's groin remains WNL. Pt has no complaints. Discharge instructions reviewed with pt. All questions answered.  Pt has stent

## 2019-11-15 NOTE — H&P
See EMR. Admit months ago for UA and had multivessel disease. Did rota/PCI to LM/CX. Presents for full revascularization given his ACS and multivesse disease (was not CABG candidate).     Past Medical History:   Diagnosis Date   • COPD (chronic ob

## 2019-11-15 NOTE — PROGRESS NOTES
Cards:    Angio of left system ok with no sig ISR in LM/LAD/CX. FFR of RCA, down to 0.72 distally. Went to 0.78 after distal lesion. Was 0.85 proximal to mid lesion. Stented distally with 2.56m72ex [place]. Posted with 3.0mm NC.   Stented mid with 2.5 No

## 2019-11-27 NOTE — PROCEDURES
[facility]    PATIENT'S NAME: Donna Nicole   ATTENDING PHYSICIAN: Pro Foss. Lino Peterson MD   OPERATING PHYSICIAN: Pro Foss.  Lino Peterson MD   PATIENT ACCOUNT#:   [id number]    LOCATION:  [place] [address] EDWP  MEDICAL RECORD #:   [id number]       DATE OF B was given for anticoagulation. We took multiple angiographic pictures. There was diffuse disease throughout the right coronary artery with a distal lesion, then disease in the mid coronary and then in the proximal as well.   Given the serial lesions, we d medical management of underlying atherosclerosis. The patient will follow with Dr. Andrew Boyer for further evaluation and therapy. Dictated By Eli Mark MD  d: 11/27/2019 16:52:51  t: 11/27/2019 17:32:05  [facility] [id number]  [facility]/

## 2019-12-10 PROBLEM — I25.10 CORONARY ARTERY DISEASE INVOLVING NATIVE CORONARY ARTERY OF NATIVE HEART WITHOUT ANGINA PECTORIS: Status: ACTIVE | Noted: 2019-12-10

## 2020-02-17 ENCOUNTER — HOSPITAL ENCOUNTER (INPATIENT)
Facility: HOSPITAL | Age: 75
LOS: 1 days | Discharge: HOME | DRG: 378 | End: 2020-02-19
Attending: EMERGENCY MEDICINE | Admitting: INTERNAL MEDICINE
Payer: MEDICARE

## 2020-02-17 DIAGNOSIS — D64.9 ANEMIA, UNSPECIFIED TYPE: Primary | ICD-10-CM

## 2020-02-17 DIAGNOSIS — K92.2 GASTROINTESTINAL HEMORRHAGE, UNSPECIFIED GASTROINTESTINAL HEMORRHAGE TYPE: ICD-10-CM

## 2020-02-17 LAB
ALBUMIN SERPL-MCNC: 3.4 G/DL (ref 3.4–5)
ALBUMIN/GLOB SERPL: 1 {RATIO} (ref 1–2)
ALP LIVER SERPL-CCNC: 89 U/L (ref 45–117)
ALT SERPL-CCNC: 17 U/L (ref 16–61)
ANION GAP SERPL CALC-SCNC: 5 MMOL/L (ref 0–18)
ANTIBODY SCREEN: NEGATIVE
AST SERPL-CCNC: 15 U/L (ref 15–37)
BASOPHILS # BLD AUTO: 0.05 X10(3) UL (ref 0–0.2)
BASOPHILS NFR BLD AUTO: 0.7 %
BILIRUB SERPL-MCNC: 0.3 MG/DL (ref 0.1–2)
BUN BLD-MCNC: 17 MG/DL (ref 7–18)
BUN/CREAT SERPL: 14 (ref 10–20)
CALCIUM BLD-MCNC: 8.3 MG/DL (ref 8.5–10.1)
CHLORIDE SERPL-SCNC: 105 MMOL/L (ref 98–112)
CO2 SERPL-SCNC: 24 MMOL/L (ref 21–32)
CREAT BLD-MCNC: 1.21 MG/DL (ref 0.7–1.3)
DEPRECATED RDW RBC AUTO: 57.1 FL (ref 35.1–46.3)
EOSINOPHIL # BLD AUTO: 0.08 X10(3) UL (ref 0–0.7)
EOSINOPHIL NFR BLD AUTO: 1.1 %
ERYTHROCYTE [DISTWIDTH] IN BLOOD BY AUTOMATED COUNT: 19.3 % (ref 11–15)
GLOBULIN PLAS-MCNC: 3.3 G/DL (ref 2.8–4.4)
GLUCOSE BLD-MCNC: 120 MG/DL (ref 70–99)
HCT VFR BLD AUTO: 20.7 % (ref 39–53)
HGB BLD-MCNC: 5.9 G/DL (ref 13–17.5)
IMM GRANULOCYTES # BLD AUTO: 0.03 X10(3) UL (ref 0–1)
IMM GRANULOCYTES NFR BLD: 0.4 %
LYMPHOCYTES # BLD AUTO: 1.06 X10(3) UL (ref 1–4)
LYMPHOCYTES NFR BLD AUTO: 14.6 %
M PROTEIN MFR SERPL ELPH: 6.7 G/DL (ref 6.4–8.2)
MCH RBC QN AUTO: 23.4 PG (ref 26–34)
MCHC RBC AUTO-ENTMCNC: 28.5 G/DL (ref 31–37)
MCV RBC AUTO: 82.1 FL (ref 80–100)
MONOCYTES # BLD AUTO: 0.61 X10(3) UL (ref 0.1–1)
MONOCYTES NFR BLD AUTO: 8.4 %
NEUTROPHILS # BLD AUTO: 5.41 X10 (3) UL (ref 1.5–7.7)
NEUTROPHILS # BLD AUTO: 5.41 X10(3) UL (ref 1.5–7.7)
NEUTROPHILS NFR BLD AUTO: 74.8 %
OSMOLALITY SERPL CALC.SUM OF ELEC: 281 MOSM/KG (ref 275–295)
PLATELET # BLD AUTO: 187 10(3)UL (ref 150–450)
POTASSIUM SERPL-SCNC: 4.4 MMOL/L (ref 3.5–5.1)
RBC # BLD AUTO: 2.52 X10(6)UL (ref 3.8–5.8)
RH BLOOD TYPE: POSITIVE
SODIUM SERPL-SCNC: 134 MMOL/L (ref 136–145)
TROPONIN I SERPL-MCNC: <0.045 NG/ML (ref ?–0.04)
WBC # BLD AUTO: 7.2 X10(3) UL (ref 4–11)

## 2020-02-17 PROCEDURE — C9113 INJ PANTOPRAZOLE SODIUM, VIA: HCPCS | Performed by: EMERGENCY MEDICINE

## 2020-02-17 PROCEDURE — 86901 BLOOD TYPING SEROLOGIC RH(D): CPT | Performed by: EMERGENCY MEDICINE

## 2020-02-17 PROCEDURE — 86920 COMPATIBILITY TEST SPIN: CPT

## 2020-02-17 PROCEDURE — 82272 OCCULT BLD FECES 1-3 TESTS: CPT

## 2020-02-17 PROCEDURE — 86850 RBC ANTIBODY SCREEN: CPT | Performed by: EMERGENCY MEDICINE

## 2020-02-17 PROCEDURE — 93010 ELECTROCARDIOGRAM REPORT: CPT

## 2020-02-17 PROCEDURE — 99285 EMERGENCY DEPT VISIT HI MDM: CPT

## 2020-02-17 PROCEDURE — 36415 COLL VENOUS BLD VENIPUNCTURE: CPT

## 2020-02-17 PROCEDURE — 86900 BLOOD TYPING SEROLOGIC ABO: CPT | Performed by: EMERGENCY MEDICINE

## 2020-02-17 PROCEDURE — 30233N1 TRANSFUSION OF NONAUTOLOGOUS RED BLOOD CELLS INTO PERIPHERAL VEIN, PERCUTANEOUS APPROACH: ICD-10-PCS | Performed by: HOSPITALIST

## 2020-02-17 PROCEDURE — 36430 TRANSFUSION BLD/BLD COMPNT: CPT

## 2020-02-17 PROCEDURE — 84484 ASSAY OF TROPONIN QUANT: CPT | Performed by: EMERGENCY MEDICINE

## 2020-02-17 PROCEDURE — 93005 ELECTROCARDIOGRAM TRACING: CPT

## 2020-02-17 PROCEDURE — 85025 COMPLETE CBC W/AUTO DIFF WBC: CPT | Performed by: EMERGENCY MEDICINE

## 2020-02-17 PROCEDURE — 80053 COMPREHEN METABOLIC PANEL: CPT | Performed by: EMERGENCY MEDICINE

## 2020-02-18 ENCOUNTER — ANESTHESIA EVENT (OUTPATIENT)
Dept: ENDOSCOPY | Facility: HOSPITAL | Age: 75
DRG: 378 | End: 2020-02-18
Payer: MEDICARE

## 2020-02-18 PROBLEM — K92.2 GASTROINTESTINAL HEMORRHAGE, UNSPECIFIED GASTROINTESTINAL HEMORRHAGE TYPE: Status: ACTIVE | Noted: 2020-02-18

## 2020-02-18 PROBLEM — D64.9 ANEMIA, UNSPECIFIED TYPE: Status: ACTIVE | Noted: 2020-02-18

## 2020-02-18 LAB
ANION GAP SERPL CALC-SCNC: 5 MMOL/L (ref 0–18)
ATRIAL RATE: 74 BPM
BASOPHILS # BLD AUTO: 0.04 X10(3) UL (ref 0–0.2)
BASOPHILS NFR BLD AUTO: 0.6 %
BUN BLD-MCNC: 14 MG/DL (ref 7–18)
BUN/CREAT SERPL: 14.6 (ref 10–20)
CALCIUM BLD-MCNC: 8.6 MG/DL (ref 8.5–10.1)
CHLORIDE SERPL-SCNC: 110 MMOL/L (ref 98–112)
CO2 SERPL-SCNC: 24 MMOL/L (ref 21–32)
CREAT BLD-MCNC: 0.96 MG/DL (ref 0.7–1.3)
DEPRECATED RDW RBC AUTO: 55.8 FL (ref 35.1–46.3)
EOSINOPHIL # BLD AUTO: 0.11 X10(3) UL (ref 0–0.7)
EOSINOPHIL NFR BLD AUTO: 1.7 %
ERYTHROCYTE [DISTWIDTH] IN BLOOD BY AUTOMATED COUNT: 18 % (ref 11–15)
GLUCOSE BLD-MCNC: 100 MG/DL (ref 70–99)
HCT VFR BLD AUTO: 27.3 % (ref 39–53)
HGB BLD-MCNC: 8.1 G/DL (ref 13–17.5)
HGB BLD-MCNC: 8.9 G/DL (ref 13–17.5)
IMM GRANULOCYTES # BLD AUTO: 0.03 X10(3) UL (ref 0–1)
IMM GRANULOCYTES NFR BLD: 0.5 %
INR BLD: 1.46 (ref 0.9–1.1)
LYMPHOCYTES # BLD AUTO: 1.03 X10(3) UL (ref 1–4)
LYMPHOCYTES NFR BLD AUTO: 16.2 %
MCH RBC QN AUTO: 25 PG (ref 26–34)
MCHC RBC AUTO-ENTMCNC: 29.7 G/DL (ref 31–37)
MCV RBC AUTO: 84.3 FL (ref 80–100)
MONOCYTES # BLD AUTO: 0.64 X10(3) UL (ref 0.1–1)
MONOCYTES NFR BLD AUTO: 10.1 %
NEUTROPHILS # BLD AUTO: 4.49 X10 (3) UL (ref 1.5–7.7)
NEUTROPHILS # BLD AUTO: 4.49 X10(3) UL (ref 1.5–7.7)
NEUTROPHILS NFR BLD AUTO: 70.9 %
OSMOLALITY SERPL CALC.SUM OF ELEC: 289 MOSM/KG (ref 275–295)
P AXIS: 61 DEGREES
P-R INTERVAL: 174 MS
PLATELET # BLD AUTO: 178 10(3)UL (ref 150–450)
POTASSIUM SERPL-SCNC: 4.1 MMOL/L (ref 3.5–5.1)
PSA SERPL DL<=0.01 NG/ML-MCNC: 18.5 SECONDS (ref 12.5–14.7)
Q-T INTERVAL: 390 MS
QRS DURATION: 88 MS
QTC CALCULATION (BEZET): 432 MS
R AXIS: 49 DEGREES
RBC # BLD AUTO: 3.24 X10(6)UL (ref 3.8–5.8)
SODIUM SERPL-SCNC: 139 MMOL/L (ref 136–145)
T AXIS: 49 DEGREES
VENTRICULAR RATE: 74 BPM
WBC # BLD AUTO: 6.3 X10(3) UL (ref 4–11)

## 2020-02-18 PROCEDURE — 85025 COMPLETE CBC W/AUTO DIFF WBC: CPT | Performed by: INTERNAL MEDICINE

## 2020-02-18 PROCEDURE — 85018 HEMOGLOBIN: CPT | Performed by: INTERNAL MEDICINE

## 2020-02-18 PROCEDURE — 85610 PROTHROMBIN TIME: CPT | Performed by: INTERNAL MEDICINE

## 2020-02-18 PROCEDURE — 94640 AIRWAY INHALATION TREATMENT: CPT

## 2020-02-18 PROCEDURE — C9113 INJ PANTOPRAZOLE SODIUM, VIA: HCPCS | Performed by: INTERNAL MEDICINE

## 2020-02-18 PROCEDURE — 80048 BASIC METABOLIC PNL TOTAL CA: CPT | Performed by: INTERNAL MEDICINE

## 2020-02-18 RX ORDER — KETOROLAC TROMETHAMINE 5 MG/ML
1 SOLUTION OPHTHALMIC 4 TIMES DAILY
Status: DISCONTINUED | OUTPATIENT
Start: 2020-02-18 | End: 2020-02-19

## 2020-02-18 RX ORDER — SODIUM CHLORIDE 9 MG/ML
INJECTION, SOLUTION INTRAVENOUS CONTINUOUS
Status: DISCONTINUED | OUTPATIENT
Start: 2020-02-18 | End: 2020-02-19

## 2020-02-18 RX ORDER — ARFORMOTEROL TARTRATE 15 UG/2ML
15 SOLUTION RESPIRATORY (INHALATION) 2 TIMES DAILY
Status: DISCONTINUED | OUTPATIENT
Start: 2020-02-18 | End: 2020-02-19

## 2020-02-18 RX ORDER — METOCLOPRAMIDE HYDROCHLORIDE 5 MG/ML
10 INJECTION INTRAMUSCULAR; INTRAVENOUS EVERY 8 HOURS PRN
Status: DISCONTINUED | OUTPATIENT
Start: 2020-02-18 | End: 2020-02-19

## 2020-02-18 RX ORDER — GUAIFENESIN 600 MG
600 TABLET, EXTENDED RELEASE 12 HR ORAL 2 TIMES DAILY
Status: DISCONTINUED | OUTPATIENT
Start: 2020-02-18 | End: 2020-02-19

## 2020-02-18 RX ORDER — ACETAMINOPHEN 325 MG/1
650 TABLET ORAL EVERY 6 HOURS PRN
Status: DISCONTINUED | OUTPATIENT
Start: 2020-02-18 | End: 2020-02-19

## 2020-02-18 RX ORDER — ZOLPIDEM TARTRATE 10 MG/1
10 TABLET ORAL NIGHTLY PRN
Status: DISCONTINUED | OUTPATIENT
Start: 2020-02-18 | End: 2020-02-19

## 2020-02-18 RX ORDER — PANTOPRAZOLE SODIUM 40 MG/1
40 TABLET, DELAYED RELEASE ORAL
COMMUNITY
End: 2021-04-27

## 2020-02-18 RX ORDER — ALBUTEROL SULFATE 90 UG/1
2 AEROSOL, METERED RESPIRATORY (INHALATION) EVERY 4 HOURS PRN
Status: DISCONTINUED | OUTPATIENT
Start: 2020-02-18 | End: 2020-02-19

## 2020-02-18 RX ORDER — PREDNISOLONE ACETATE 10 MG/ML
1 SUSPENSION/ DROPS OPHTHALMIC 4 TIMES DAILY
Status: DISCONTINUED | OUTPATIENT
Start: 2020-02-18 | End: 2020-02-19

## 2020-02-18 RX ORDER — ONDANSETRON 2 MG/ML
4 INJECTION INTRAMUSCULAR; INTRAVENOUS EVERY 6 HOURS PRN
Status: DISCONTINUED | OUTPATIENT
Start: 2020-02-18 | End: 2020-02-19

## 2020-02-18 RX ORDER — PANTOPRAZOLE SODIUM 40 MG/1
40 TABLET, DELAYED RELEASE ORAL
Status: DISCONTINUED | OUTPATIENT
Start: 2020-02-18 | End: 2020-02-18

## 2020-02-18 RX ORDER — ALBUTEROL SULFATE 2.5 MG/3ML
2.5 SOLUTION RESPIRATORY (INHALATION) EVERY 6 HOURS PRN
Status: DISCONTINUED | OUTPATIENT
Start: 2020-02-18 | End: 2020-02-19

## 2020-02-18 RX ORDER — FUROSEMIDE 20 MG/1
20 TABLET ORAL DAILY
Status: DISCONTINUED | OUTPATIENT
Start: 2020-02-18 | End: 2020-02-19

## 2020-02-18 RX ORDER — ATORVASTATIN CALCIUM 10 MG/1
10 TABLET, FILM COATED ORAL NIGHTLY
Status: DISCONTINUED | OUTPATIENT
Start: 2020-02-18 | End: 2020-02-19

## 2020-02-18 RX ORDER — ARFORMOTEROL TARTRATE 15 UG/2ML
15 SOLUTION RESPIRATORY (INHALATION)
Status: DISCONTINUED | OUTPATIENT
Start: 2020-02-18 | End: 2020-02-18

## 2020-02-18 NOTE — PLAN OF CARE
NURSING ADMISSION NOTE      Patient admitted via Cart  Oriented to room. Safety precautions initiated. Bed in low position. Call light in reach.       Problem: Patient/Family Goals  Goal: Patient/Family Long Term Goal  Description  Patient's Long Ter Refer to Case Management Department for coordinating discharge planning if the patient needs post-hospital services based on physician/LIP order or complex needs related to functional status, cognitive ability or social support system  Outcome: Progressing

## 2020-02-18 NOTE — PROGRESS NOTES
02/18/20 0630   Provider Notification   Reason for Communication New consult   Provider Name Eloina Pierre MD   Method of Communication Page   Response Waiting for response   Notification Time 0630   MD called a\nd will see the patient

## 2020-02-18 NOTE — ED PROVIDER NOTES
Patient Seen in: [facility] Emergency Department      History   Patient presents with:  Dyspnea MTAHIEU SOB    Stated Complaint: mathieu, worse with exertion, cardiac hx    HPI    This is a 41-year-old man history of COPD, hypertension, hyperlipidemia, prev atraumatic. Mouth/Throat:      Mouth: Mucous membranes are moist.   Cardiovascular:      Rate and Rhythm: Normal rate and regular rhythm. Pulmonary:      Effort: Pulmonary effort is normal.      Breath sounds: Normal breath sounds.  No wheezing, rhon ANTIBODY FXNAVB[[id number]]                                  Final result                 Please view results for these tests on the individual orders.    PREPARE RBC   ABORH (BLOOD TYPE)   ANTIBODY SCREEN   RAINBOW DRAW BLUE   RAINBOW DRAW LAVENDER   [facility] positive stool on exam here today. He does state if symptoms have been progressive over many weeks and not acute. His EKG has no acute changes, his troponin is negative.   Patient received a PPI bolus, he will be admitted for further evaluation by GI, 2 u

## 2020-02-18 NOTE — PLAN OF CARE
Pt is A+Ox4. VSS on RA. NSR on tele. Anticoagulation currently on hold d.t possible GI bleed. Tolerating CLD. To start Golytely this evening for EGD/Colonoscopy tomorrow. NPO after 5am. Voids via bathroom. Up with SBA. IVF.  Pt updated patient on plan of c preferences of patient/family/discharge partner  - Complete POLST form as appropriate  - Assess patient's ability to be responsible for managing their own health  - Refer to Case Management Department for coordinating discharge planning if the patient need

## 2020-02-18 NOTE — ANESTHESIA PREPROCEDURE EVALUATION
PRE-OP EVALUATION    Patient Name: Anyi Zeng    Pre-op Diagnosis: in patient    Procedure(s):  Esophagogastroduodenoscopy/colonoscopy    Surgeon(s) and Role:     Job Rosa MD - Primary    Pre-op vitals reviewed.   Temp: 98.2 °F (36.8 °C)  Pulse: 89  R severe      (+) shortness of breath            Neuro/Psych                                    Past Surgical History:   Procedure Laterality Date   • ESOPHAGOGASTRODUODENOSCOPY (EGD) N/A 9/1/2019    Performed by Ramon Perez MD at [facility] ENDOSCOPY   • [address] Admission:  **None**

## 2020-02-18 NOTE — CONSULTS
[facility] Cardiology Consultation    Donehannah Verma Patient Status:  Inpatient    1945 MRN [id number]   [facility] 5NW-A Attending Acosta Velazquez MD   Hosp Day # 0 PCP Yarelis Omalley MD     Reason for Consultation:  GIB, a/c and plavix      Hi 71  Resp:  [15-21] 18  BP: (120-136)/(43-57) 135/49    Last 3 Weights  02/18/20 0138 : 207 lb 0.2 oz (93.9 kg)  02/17/20 2013 : 202 lb (91.6 kg)  01/22/20 1145 : 204 lb (92.5 kg)  01/17/20 0925 : 204 lb (92.5 kg)          General: No apparent distress  JUSTINO Eliquis all together. Will hold ASA and plavix until tomorrow. Stable for EGD/colon . Med rec complete.     Raoul Hill  2/18/2020  9:32 AM

## 2020-02-18 NOTE — PROGRESS NOTES
02/18/20 0233   Provider Notification   Reason for Communication Med Rec   Provider Name Other (comment)  (Army Spencer )   Method of Communication Page   Response Waiting for response   Notification Time 0234      MD called and ordered to hold the home med

## 2020-02-18 NOTE — PROGRESS NOTES
Multidisciplinary Discharge Rounds held 2/18/2020. Treatment team members present today include , , Charge Nurse, Nurse, RT, PT and Pharmacy caring for [employer].      Other care providers present:    Mobility Goal: up with

## 2020-02-18 NOTE — H&P
[facility] hospitalist H+P  PCP Caron Batres MD   Patient was seen/examined on 2/18/20  CC SOB  HPI 67 yo male with multiple medical problems including but not limited to CAD, HTN, HL, COPD, hx of GI bleed, hx of DVT on Eliquis  came for evaluation of generalized Alcohol use: No      Drug use: No      Sexual activity: Not on file    Lifestyle      Physical activity:        Days per week: Not on file        Minutes per session: Not on file      Stress: Not on file    Relationships      Social connections:         Ta Tartrate 25 MG Oral Tab, Take 1 tablet (25 mg total) by mouth 2x Daily(Beta Blocker). , Disp: 60 tablet, Rfl: 11  furosemide (LASIX) 20 MG Oral Tab, Take 1 tablet (20 mg total) by mouth daily. , Disp: 90 tablet, Rfl: 3  Formoterol Fumarate (PERFOROMIST) 20 M nikunj Young MD  [facility]  [phone]

## 2020-02-18 NOTE — CONSULTS
[facility] Patient Status:  Inpatient   Date of Birth 4/1/1945 MRN [id number]   [facility] 5NW-A Attending Raisa Whelan MD   Hosp Day # 0 PCP Sandee Valdez MD     Date of Consultat PA    Medication Fluticasone Furoate-Vilanterol (BREO ELLIPTA) 200-25 MCG/INH Inhalation Aerosol Powder, Breath Activated, Sig Inhale 1 puff into the lungs daily. , Start Date 10/16/19, End Date , Taking?  Yes, Authorizing Provider Lauren Marrufo MD Sig Take 1 tablet (600 mg total) by mouth 2 (two) times daily. , Start Date 10/16/19, End Date , Taking? , Authorizing Provider Ariel Faye MD    Medication Albuterol Sulfate HFA (PROAIR HFA) 108 (90 Base) MCG/ACT Inhalation Aero Soln, Sig Inhale 02/18/2020    CO2 24.0 02/18/2020     02/18/2020    CA 8.6 02/18/2020    ALB 3.4 02/17/2020    ALKPHO 89 02/17/2020    BILT 0.3 02/17/2020    TP 6.7 02/17/2020    AST 15 02/17/2020    ALT 17 02/17/2020    INR 1.46 02/18/2020    PTP 18.5 02/18/2020

## 2020-02-19 ENCOUNTER — ANESTHESIA (OUTPATIENT)
Dept: ENDOSCOPY | Facility: HOSPITAL | Age: 75
DRG: 378 | End: 2020-02-19
Payer: MEDICARE

## 2020-02-19 ENCOUNTER — APPOINTMENT (OUTPATIENT)
Dept: GENERAL RADIOLOGY | Facility: HOSPITAL | Age: 75
DRG: 378 | End: 2020-02-19
Attending: HOSPITALIST
Payer: MEDICARE

## 2020-02-19 VITALS
RESPIRATION RATE: 17 BRPM | HEIGHT: 70 IN | HEART RATE: 86 BPM | BODY MASS INDEX: 29.63 KG/M2 | TEMPERATURE: 98 F | SYSTOLIC BLOOD PRESSURE: 141 MMHG | DIASTOLIC BLOOD PRESSURE: 52 MMHG | WEIGHT: 207 LBS | OXYGEN SATURATION: 96 %

## 2020-02-19 LAB
ANION GAP SERPL CALC-SCNC: 5 MMOL/L (ref 0–18)
BASOPHILS # BLD AUTO: 0.06 X10(3) UL (ref 0–0.2)
BASOPHILS NFR BLD AUTO: 0.7 %
BLOOD TYPE BARCODE: 6200
BUN BLD-MCNC: 11 MG/DL (ref 7–18)
BUN/CREAT SERPL: 12.2 (ref 10–20)
CALCIUM BLD-MCNC: 9.1 MG/DL (ref 8.5–10.1)
CHLORIDE SERPL-SCNC: 106 MMOL/L (ref 98–112)
CO2 SERPL-SCNC: 26 MMOL/L (ref 21–32)
CREAT BLD-MCNC: 0.9 MG/DL (ref 0.7–1.3)
DEPRECATED RDW RBC AUTO: 55.2 FL (ref 35.1–46.3)
EOSINOPHIL # BLD AUTO: 0.22 X10(3) UL (ref 0–0.7)
EOSINOPHIL NFR BLD AUTO: 2.7 %
ERYTHROCYTE [DISTWIDTH] IN BLOOD BY AUTOMATED COUNT: 18.1 % (ref 11–15)
GLUCOSE BLD-MCNC: 97 MG/DL (ref 70–99)
HCT VFR BLD AUTO: 32.2 % (ref 39–53)
HGB BLD-MCNC: 9.4 G/DL (ref 13–17.5)
IMM GRANULOCYTES # BLD AUTO: 0.02 X10(3) UL (ref 0–1)
IMM GRANULOCYTES NFR BLD: 0.2 %
LYMPHOCYTES # BLD AUTO: 1.18 X10(3) UL (ref 1–4)
LYMPHOCYTES NFR BLD AUTO: 14.6 %
MCH RBC QN AUTO: 24.6 PG (ref 26–34)
MCHC RBC AUTO-ENTMCNC: 29.2 G/DL (ref 31–37)
MCV RBC AUTO: 84.3 FL (ref 80–100)
MONOCYTES # BLD AUTO: 0.77 X10(3) UL (ref 0.1–1)
MONOCYTES NFR BLD AUTO: 9.5 %
NEUTROPHILS # BLD AUTO: 5.84 X10 (3) UL (ref 1.5–7.7)
NEUTROPHILS # BLD AUTO: 5.84 X10(3) UL (ref 1.5–7.7)
NEUTROPHILS NFR BLD AUTO: 72.3 %
OSMOLALITY SERPL CALC.SUM OF ELEC: 283 MOSM/KG (ref 275–295)
PLATELET # BLD AUTO: 222 10(3)UL (ref 150–450)
POTASSIUM SERPL-SCNC: 4 MMOL/L (ref 3.5–5.1)
RBC # BLD AUTO: 3.82 X10(6)UL (ref 3.8–5.8)
SODIUM SERPL-SCNC: 137 MMOL/L (ref 136–145)
WBC # BLD AUTO: 8.1 X10(3) UL (ref 4–11)

## 2020-02-19 PROCEDURE — 0DBP8ZZ EXCISION OF RECTUM, VIA NATURAL OR ARTIFICIAL OPENING ENDOSCOPIC: ICD-10-PCS | Performed by: INTERNAL MEDICINE

## 2020-02-19 PROCEDURE — 0DB98ZX EXCISION OF DUODENUM, VIA NATURAL OR ARTIFICIAL OPENING ENDOSCOPIC, DIAGNOSTIC: ICD-10-PCS | Performed by: INTERNAL MEDICINE

## 2020-02-19 PROCEDURE — 85025 COMPLETE CBC W/AUTO DIFF WBC: CPT | Performed by: INTERNAL MEDICINE

## 2020-02-19 PROCEDURE — C9113 INJ PANTOPRAZOLE SODIUM, VIA: HCPCS | Performed by: INTERNAL MEDICINE

## 2020-02-19 PROCEDURE — 88305 TISSUE EXAM BY PATHOLOGIST: CPT | Performed by: INTERNAL MEDICINE

## 2020-02-19 PROCEDURE — 0DBM8ZZ EXCISION OF DESCENDING COLON, VIA NATURAL OR ARTIFICIAL OPENING ENDOSCOPIC: ICD-10-PCS | Performed by: INTERNAL MEDICINE

## 2020-02-19 PROCEDURE — 71045 X-RAY EXAM CHEST 1 VIEW: CPT | Performed by: HOSPITALIST

## 2020-02-19 PROCEDURE — 0DB78ZX EXCISION OF STOMACH, PYLORUS, VIA NATURAL OR ARTIFICIAL OPENING ENDOSCOPIC, DIAGNOSTIC: ICD-10-PCS | Performed by: INTERNAL MEDICINE

## 2020-02-19 PROCEDURE — 0DBN8ZZ EXCISION OF SIGMOID COLON, VIA NATURAL OR ARTIFICIAL OPENING ENDOSCOPIC: ICD-10-PCS | Performed by: INTERNAL MEDICINE

## 2020-02-19 PROCEDURE — 0DBE8ZZ EXCISION OF LARGE INTESTINE, VIA NATURAL OR ARTIFICIAL OPENING ENDOSCOPIC: ICD-10-PCS | Performed by: INTERNAL MEDICINE

## 2020-02-19 PROCEDURE — 80048 BASIC METABOLIC PNL TOTAL CA: CPT | Performed by: HOSPITALIST

## 2020-02-19 RX ORDER — LIDOCAINE HYDROCHLORIDE 10 MG/ML
INJECTION, SOLUTION EPIDURAL; INFILTRATION; INTRACAUDAL; PERINEURAL AS NEEDED
Status: DISCONTINUED | OUTPATIENT
Start: 2020-02-19 | End: 2020-02-19 | Stop reason: SURG

## 2020-02-19 RX ORDER — SODIUM CHLORIDE, SODIUM LACTATE, POTASSIUM CHLORIDE, CALCIUM CHLORIDE 600; 310; 30; 20 MG/100ML; MG/100ML; MG/100ML; MG/100ML
INJECTION, SOLUTION INTRAVENOUS CONTINUOUS
Status: CANCELLED | OUTPATIENT
Start: 2020-02-19

## 2020-02-19 RX ORDER — NALOXONE HYDROCHLORIDE 0.4 MG/ML
80 INJECTION, SOLUTION INTRAMUSCULAR; INTRAVENOUS; SUBCUTANEOUS AS NEEDED
Status: CANCELLED | OUTPATIENT
Start: 2020-02-19 | End: 2020-02-19

## 2020-02-19 RX ORDER — HYDROMORPHONE HYDROCHLORIDE 1 MG/ML
0.4 INJECTION, SOLUTION INTRAMUSCULAR; INTRAVENOUS; SUBCUTANEOUS EVERY 5 MIN PRN
Status: CANCELLED | OUTPATIENT
Start: 2020-02-19 | End: 2020-02-19

## 2020-02-19 RX ORDER — MELATONIN
325 2 TIMES DAILY WITH MEALS
Qty: 20 TABLET | Refills: 0 | Status: SHIPPED | OUTPATIENT
Start: 2020-02-19 | End: 2021-01-04

## 2020-02-19 RX ORDER — PANTOPRAZOLE SODIUM 40 MG/1
40 TABLET, DELAYED RELEASE ORAL
Status: DISCONTINUED | OUTPATIENT
Start: 2020-02-20 | End: 2020-02-19

## 2020-02-19 RX ORDER — MELATONIN
325 2 TIMES DAILY WITH MEALS
Status: DISCONTINUED | OUTPATIENT
Start: 2020-02-19 | End: 2020-02-19

## 2020-02-19 RX ADMIN — LIDOCAINE HYDROCHLORIDE 50 MG: 10 INJECTION, SOLUTION EPIDURAL; INFILTRATION; INTRACAUDAL; PERINEURAL at 15:28:00

## 2020-02-19 NOTE — PROGRESS NOTES
Spoke with Dr. Pamela Angela regarding patients anticoagulation. Per Dr. Pamela Angela patient is okay to DC and  to restart his Aspirin and Plavix today.

## 2020-02-19 NOTE — PLAN OF CARE
Problem: Patient/Family Goals  Goal: Patient/Family Long Term Goal  Description  Patient's Long Term Goal:   Discharge to home     Interventions:  - follow plan of care  - See additional Care Plan goals for specific interventions   Outcome: Progressing services based on physician/LIP order or complex needs related to functional status, cognitive ability or social support system  Outcome: Progressing   Pt is alert and oriented x4. VSS. Maintaining o2 sats on r/a.  NSR on tele. Pt voiding well.   Brion Nissen

## 2020-02-19 NOTE — CONSULTS
Pulmonary / Critical Care H&P/Consult       NAME: Jalil Hanson - ROOM: [address] [place] [address] - MRN: [id number] - Age: 66year old - :  1945    Date of Admission: 2020  8:52 PM  Admission Diagnosis: Gastrointestinal hemorrhage, unspecified gastrointestina Activity      Alcohol use: No      Drug use: No      Sexual activity: Not on file    Lifestyle      Physical activity:        Days per week: Not on file        Minutes per session: Not on file      Stress: Not on file    Relationships      Social connectio tablet, Rfl: 3  metoprolol Tartrate 25 MG Oral Tab, Take 1 tablet (25 mg total) by mouth 2x Daily(Beta Blocker). , Disp: 60 tablet, Rfl: 11  furosemide (LASIX) 20 MG Oral Tab, Take 1 tablet (20 mg total) by mouth daily. , Disp: 90 tablet, Rfl: 3        Sched cooperative, no distress, appears stated age   Head:    Normocephalic, without obvious abnormality, atraumatic   Eyes:    PERRL, conjunctiva/corneas clear   Neck:   Supple, symmetrical, trachea midline, no adenopathy;        thyroid:  No enlargement/tender peripherally            Nolberto Lemos M.D.  [facility] Pulmonary / Critical Care  2/19/2020

## 2020-02-19 NOTE — ANESTHESIA POSTPROCEDURE EVALUATION
[address] [facility] Patient Status:  Inpatient   Age/Gender 66year old male MRN [id number]   Location [address] [facility]. Attending Luis Nix, [address] [facility] Se Day # 1 PCP Brandy Spicer MD       Anesthesia Post-op Note    Procedure(s):   C

## 2020-02-19 NOTE — PROGRESS NOTES
[facility] [address] [place] [address] Cardiology Progress Note        Corky Rice Patient Status:  Inpatient    1945 MRN [id number]   [facility] 5NW-A Attending Stuart James MD   Hosp Day # 1 PCP Diandra Ojeda MD     Subjective:   Bowel 222.0       Chem:  Recent Labs   Lab 02/17/20  2153 02/18/20  0631 02/19/20  0622   * 139 137   K 4.4 4.1 4.0    110 106   CO2 24.0 24.0 26.0   BUN 17 14 11   CREATSERUM 1.21 0.96 0.90   CA 8.3* 8.6 9.1   * 100* 97       Recent Labs   La

## 2020-02-19 NOTE — PROGRESS NOTES
[facility] hospitalist daily note  Patient was seen/examined on 2/19/20    S; no chest pain, no abd pain, no nausea/emesis, denies bleeding    medicaitons in Epic    PE    02/19/20  1637   BP: 141/52   Pulse: 86   Resp: 17   Temp: 97.9 °F (36.6 °C)     Gen: awake

## 2020-02-19 NOTE — OPERATIVE REPORT
[facility]  Esophagogastroduodenoscopy and Colonoscopy Report    [address] Johnny Edouard Patient Status:  Inpatient   Date of Birth 4/1/1945 MRN [id number]   [facility] ENDOSCOPY Attending Nini Ventura MD     PCP Sandee Valdez, normal.  The pylorus duodenal bulb and descending duodenum are normal. Biopsies were taken from the gastric antrum and descending duodenum. The visualized colonic mucosa reveals nine polyps.  A 3 mm hepatic flexure polyp was removed using forceps polypec

## 2020-02-19 NOTE — PLAN OF CARE
Problem: Patient/Family Goals  Goal: Patient/Family Long Term Goal  Description  Patient's Long Term Goal:   Discharge to home     Interventions:  - follow plan of care  - See additional Care Plan goals for specific interventions   Outcome: Adequate for if the patient needs post-hospital services based on physician/LIP order or complex needs related to functional status, cognitive ability or social support system  Outcome: Adequate for Discharge

## 2020-02-20 NOTE — PROGRESS NOTES
NURSING DISCHARGE NOTE    Discharged Home via Wheelchair. Accompanied by Family member  Belongings Taken by patient/family. Pt is assessed and ready for discharge. Instructions and follow up gone over with patient.  Patient told to start his Rocio

## 2020-02-24 NOTE — DISCHARGE SUMMARY
[facility] Internal Medicine Discharge Summary   Patient ID:  Brisa Lewis  [id number]  43 year old  4/1/1945    Admit date: 2/17/2020    Discharge date and time: 2/19/2020     Attending Physician: No att. providers found     Primary Care Physician: Onel Acevedo GI consulted, EGD and C-scope done (please see Epic)  Follow up with GI for path result  GI is ok with resuming ASA and plavix     SUNG, COPD  Appreciate pulm input  Likely acute on chronic due to COPD, anemia and prior lobectomy.  No brochospasm on exam.    Take 2 mL (15 mcg total) by nebulization 2 (two) times daily. aspirin 81 MG Tbec     Clopidogrel Bisulfate 75 MG Tabs  Commonly known as:  PLAVIX  Take 1 tablet (75 mg total) by mouth daily.      Fluticasone Furoate-Vilanterol 200-25 MCG/INH Aepb  Commo PROCEDURE:  XR CHEST AP PORTABLE  (CPT=71045)  TECHNIQUE:  AP chest radiograph was obtained. COMPARISON:  [facility] , XR, XR CHEST PA + LAT CHEST (CPT=71046), 9/05/2019, 11:37.   INDICATIONS:  SOB  PATIENT STATED HISTORY: (As transcribed by Technologist)  Feroz Ott Do not exceed 4 grams acetaminophen within 24 hours from ALL sources      Total Time Coordinating Care: Greater than 30 minutes    Patient had opportunity to ask questions and state understand and agree with therapeutic plan as outlined      Yady Wang,

## 2020-02-27 NOTE — CDS QUERY
Clarification – Potential Diagnosis Association  CLINICAL DOCUMENTATION CLARIFICATION FORM    Clinical information in the patient's medical record (provided below) includes documentation of diagnoses with potential association.  For accurate ICD-10-CM code Eliquis *he has IVC filter)  Resume ASA, plavix when ok with G     If you have any questions, please contact Clinical : Otf Lomas RN CCDS  [phone]    THIS FORM IS A PERMANENT PART OF THE MEDICAL RECORD

## 2020-06-30 PROCEDURE — 88300 SURGICAL PATH GROSS: CPT | Performed by: PHYSICIAN ASSISTANT

## 2020-08-18 RX ORDER — FUROSEMIDE 20 MG/1
TABLET ORAL
Qty: 90 TABLET | Refills: 2 | Status: SHIPPED | OUTPATIENT
Start: 2020-08-18 | End: 2021-05-26

## 2020-08-18 RX ORDER — CLOPIDOGREL BISULFATE 75 MG/1
TABLET ORAL
Qty: 90 TABLET | Refills: 3 | Status: SHIPPED | OUTPATIENT
Start: 2020-08-18 | End: 2021-05-26

## 2021-04-27 PROBLEM — I25.119 CORONARY ARTERY DISEASE INVOLVING NATIVE CORONARY ARTERY OF NATIVE HEART WITH ANGINA PECTORIS (HCC): Status: ACTIVE | Noted: 2019-12-10

## (undated) DEVICE — CATH GOLD PROBE HEMOGLIDE 7FR

## (undated) DEVICE — ENDOSCOPY PACK UPPER: Brand: MEDLINE INDUSTRIES, INC.

## (undated) DEVICE — REM POLYHESIVE ADULT PATIENT RETURN ELECTRODE: Brand: VALLEYLAB

## (undated) DEVICE — 1200CC GUARDIAN II: Brand: GUARDIAN

## (undated) DEVICE — TRAP 4 CPTR CHMBR N EZ INLN

## (undated) DEVICE — SNARE CAPTIFLEX MICRO-OVL OLY

## (undated) DEVICE — FILTERLINE NASAL ADULT O2/CO2

## (undated) DEVICE — Device: Brand: DEFENDO AIR/WATER/SUCTION AND BIOPSY VALVE

## (undated) DEVICE — ENDOSCOPY PACK - LOWER: Brand: MEDLINE INDUSTRIES, INC.

## (undated) DEVICE — NEEDLE CONTRAST INTERJECT 25G

## (undated) DEVICE — 3M™ RED DOT™ MONITORING ELECTRODE WITH FOAM TAPE AND STICKY GEL, 50/BAG, 20/CASE, 72/PLT 2570: Brand: RED DOT™

## (undated) DEVICE — FORCEP BIOPSY RJ4 LG CAP W/ND

## (undated) NOTE — LETTER
Kourtney Leslie 182 6 [address] E  [place], [address] [place] [address]    Consent for Operation  Date: __________________                                Time: _______________    1.  I authorize the performance upon Priyank Daniel the following operation:  Procedure procedure has been videotaped, the surgeon will obtain the original videotape. The hospital will not be responsible for storage or maintenance of this tape.   8. For the purpose of advancing medical education, I consent to the admittance of observers to the STATEMENTS REQUIRING INSERTION OR COMPLETION WERE FILLED IN.     Signature of Patient:   ___________________________    When the patient is a minor or mentally incompetent to give consent:  Signature of person authorized to consent for patient: ____________ supplements, and pills I can buy without a prescription (including street drugs/illegal medications). Failure to inform my anesthesiologist about these medicines may increase my risk of anesthetic complications. iv.  If I am allergic to anything or have ha Anesthesiologist Signature     Date   Time  I have discussed the procedure and information above with the patient (or patient’s representative) and answered their questions. The patient or their representative has agreed to have anesthesia services.     ___

## (undated) NOTE — LETTER
September 17, 2019          Javier Martinez  [address] [address]          Dear Gail Maya: The following are the results of your recent tests ordered by Dr. Jewels Ross. .  Please review the list of test results.   Your result is the v Leukocyte Esterase Urine Negative Negative    Microscopic Microscopic not indicated    CBC, PLATELET; NO DIFFERENTIAL   Result Value Ref Range    WBC 18.5 (H) 4.0 - 11.0 x10(3) uL    RBC 4.84 3.80 - 5.80 x10(6)uL    HGB 12.6 (L) 13.0 - 17.5 g/dL    HCT 40 GFR, Non- 82 >=60    GFR, -American 94 >=11   BASIC METABOLIC PANEL (8)   Result Value Ref Range    Glucose 125 (H) 70 - 99 mg/dL    Sodium 139 136 - 145 mmol/L    Potassium 4.5 3.5 - 5.1 mmol/L    Chloride 102 98 - 112 mmol/L    CO GFR, -American 85 >=60   CBC, PLATELET; NO DIFFERENTIAL   Result Value Ref Range    WBC 15.9 (H) 4.0 - 11.0 x10(3) uL    RBC 4.46 3.80 - 5.80 x10(6)uL    HGB 11.6 (L) 13.0 - 17.5 g/dL    HCT 36.6 (L) 39.0 - 53.0 %    .0 150.0 - 450.0 10(3)uL HEMOGLOBIN + HEMATOCRIT   Result Value Ref Range    HGB 7.7 (L) 13.0 - 17.5 g/dL    HCT 24.0 (L) 39.0 - 53.0 %   HEMOGLOBIN   Result Value Ref Range    HGB 9.9 (L) 13.0 - 17.5 g/dL   BASIC METABOLIC PANEL (8)   Result Value Ref Range    Glucose 126 (H) 70 Calcium, Total 7.5 (L) 8.5 - 10.1 mg/dL    Calculated Osmolality 298 (H) 275 - 295 mOsm/kg    GFR, Non- 86 >=60    GFR, -American 99 >=60   MANUAL DIFFERENTIAL   Result Value Ref Range    Neutrophil Absolute Manual 16.11 (H) 1.50 - Total Cells Counted 100     RBC Morphology Normal Normal, Slide reviewed, see previous RBC morphology.     Platelet Morphology Normal Normal   HEMOGLOBIN   Result Value Ref Range    HGB 8.9 (L) 13.0 - 17.5 g/dL   PROTHROMBIN TIME (PT)   Result Value Ref Ra Result Value Ref Range    Antibody Screen Negative    ABORH (BLOOD TYPE)   Result Value Ref Range    ABO BLOOD TYPE A     RH BLOOD TYPE Positive    ANTIBODY SCREEN   Result Value Ref Range    Antibody Screen Negative    PREPARE RBC   Result Value Ref Range WBC 10.9 4.0 - 11.0 x10(3) uL    RBC 4.71 3.80 - 5.80 x10(6)uL    HGB 12.3 (L) 13.0 - 17.5 g/dL    HCT 38.3 (L) 39.0 - 53.0 %    .0 150.0 - 450.0 10(3)uL    MCV 81.3 80.0 - 100.0 fL    MCH 26.1 26.0 - 34.0 pg    MCHC 32.1 31.0 - 37.0 g/dL    RDW 17 MCH 27.4 26.0 - 34.0 pg    MCHC 31.9 31.0 - 37.0 g/dL    RDW 17.3 (H) 11.0 - 15.0 %    RDW-SD 53.1 (H) 35.1 - 46.3 fL    Neutrophil Absolute Prelim 14.63 (H) 1.50 - 7.70 x10 (3) uL   CBC W/ DIFFERENTIAL   Result Value Ref Range    WBC 17.1 (H) 4.0 - 11.0

## (undated) NOTE — LETTER
[facility] [address], [address]  Consent for Procedure/Sedation    Date:     Time:       1.  I authorize the performance upon Stormy Wells the following:cardiac catheterization, left ventricular cineangiography, bilateral garrett period, the physician will determine when the applicable recovery period ends for purposes of reinstating the Do Not Resuscitate (DNR) order.     Signature of Patient: ____________________________________________________    Signature of person authorized

## (undated) NOTE — LETTER
[address] [place] [address] Rd, [place], [place]     AUTHORIZATION FOR SURGICAL OPERATION OR PROCEDURE    I hereby authorize Dr. Sesar Anthony MD, my Physician(s) and whomever may be designated as the doctor's Assistant, to perform 4. I consent to the photographing of procedure(s) to be performed for the purposes of advancing medicine, science and/or education, provided my identity is not revealed.  If the procedure has been videotaped, the physician/surgeon will obtain the original v (Witness signature)                                                                                                  (Date)                                (Time)  STATEMENT OF PHYSICIAN My signature below affirms that prior to the time of the procedure;  I

## (undated) NOTE — LETTER
Mckay Ervin M.D., F.A.C.S. Nelda Page M.D., F.A.C.S. Juan Cummins M.D., Romeo Farrell M.D., F.A.C.S. Lencho Abernathy. Precious Bansal M.D., F.A.C.S. Cristofer Gamboa M.D. GRETEL aCrbajal M.D., F. chance to have all of your questions and concerns answered. If there are any issues which have not been adequately addressed, we ask you to bring them forward so that we can thoroughly address them.     A patient who is fully informed and understands their treatment, among other options and the risks and benefits of the different treatment options:    Yes _____ No _____    A [facility] surgeon as explained to me that if I should so desire, he/she is willing to explain my case and the surgical and non-surgical optio

## (undated) NOTE — LETTER
[address] [facility] FOR BLOOD OR BLOOD COMPONENTS      In the course of your treatment, it may become necessary to administer a transfusion of blood or blood components.  This form provides basic information concerning this proc explain the alternatives to you if it has not already been done. I,Grant Aparicio, have read/had read to me the above. I understand the matters bearing on the decision whether or not to authorize a transfusion of blood or blood components.  I have no ques

## (undated) NOTE — LETTER
Kourtney Leslie [address] [place] [address] E  [place], [address] [facility]    Consent for Operation  Date: _8/28/19_________________                                Time: ___0700__________    1.  I authorize the performance upon Usha Pinto the following operation: videotape. The [facility] will not be responsible for storage or maintenance of this tape. 6. For the purpose of advancing medical education, I consent to the admittance of observers to the Operating Room.   7. I authorize the use of any specimen, organs, ti When the patient is a minor or mentally incompetent to give consent:  Signature of person authorized to consent for patient: ___________________________   Relationship to patient: ____________________________________________________    Signature of Witness these medicines may increase my risk of anesthetic complications. iv. If I am allergic to anything or have had a reaction to anesthesia before. 3. I understand how the anesthesia medicine will help me (benefits).   4. I understand that with any type of an patient’s representative) and answered their questions. The patient or their representative has agreed to have anesthesia services.     _____________________________________________________________________________  Witness        Date   Time  I have yeison

## (undated) NOTE — LETTER
[facility] [address], [address]  Consent for Procedure/Sedation    Date: 8/29/2019    Time: 13:14      1.  I authorize the performance upon Brisa Lewis the following:cardiac catheterization, left ventricular cineangiography, period, the physician will determine when the applicable recovery period ends for purposes of reinstating the Do Not Resuscitate (DNR) order.     Signature of Patient: ____________________________________________________    Signature of person authorized

## (undated) NOTE — LETTER
Kourtney Leslie 182 6 [address] E  [place], [address] [place] [address]    Consent for Operation  Date: __________________                                Time: _______________    1.  I authorize the performance upon Curly Martinez the following operation:  Procedure procedure has been videotaped, the surgeon will obtain the original videotape. The hospital will not be responsible for storage or maintenance of this tape.   7. For the purpose of advancing medical education, I consent to the admittance of observers to the STATEMENTS REQUIRING INSERTION OR COMPLETION WERE FILLED IN.     Signature of Patient:   ___________________________    When the patient is a minor or mentally incompetent to give consent:  Signature of person authorized to consent for patient: ____________ supplements, and pills I can buy without a prescription (including street drugs/illegal medications). Failure to inform my anesthesiologist about these medicines may increase my risk of anesthetic complications. iv.  If I am allergic to anything or have ha Anesthesiologist Signature     Date   Time  I have discussed the procedure and information above with the patient (or patient’s representative) and answered their questions. The patient or their representative has agreed to have anesthesia services.     ___

## (undated) NOTE — LETTER
[facility] [address], [address] [place] [address]    Consent for Anesthesia   1.    Joy Enriquez agree to be cared for by a physician anesthesiologist alone and/or with a nurse anesthetist, who is specially trained to monitor me and give me allergic reactions to medications, injury to my airway, heart, lungs, vision, nerves, or muscles and in extremely rare instances death. 5. My doctor has explained to me other choices available to me for my care (alternatives).   6. Pregnant Patients (“epid Printed: 2/18/2020 at 2:53 PM    Medical Record #: [id number]                                            Page 1 of 1

## (undated) NOTE — LETTER
Consent to Procedure/Sedation    Date: __________________    Time: _______________    1. I authorize the performance upon Meseret Barnett the following:      Insertion IVC Filter    2.  I authorize Dr. Peter Nance (and whomever is designated as the doctor’s as Witness: ____________________     Date: ______________    Printed: 2019   11:18 AM    Patient Name: Trey Foy        : 1945       Medical Record #: [id number]